# Patient Record
Sex: MALE | Race: WHITE | Employment: FULL TIME | URBAN - METROPOLITAN AREA
[De-identification: names, ages, dates, MRNs, and addresses within clinical notes are randomized per-mention and may not be internally consistent; named-entity substitution may affect disease eponyms.]

---

## 2017-02-23 ENCOUNTER — OFFICE VISIT (OUTPATIENT)
Dept: INTERNAL MEDICINE | Facility: CLINIC | Age: 57
End: 2017-02-23
Payer: COMMERCIAL

## 2017-02-23 VITALS
WEIGHT: 188 LBS | TEMPERATURE: 97.8 F | HEIGHT: 74 IN | OXYGEN SATURATION: 97 % | SYSTOLIC BLOOD PRESSURE: 110 MMHG | HEART RATE: 54 BPM | DIASTOLIC BLOOD PRESSURE: 66 MMHG | BODY MASS INDEX: 24.13 KG/M2

## 2017-02-23 DIAGNOSIS — Z12.5 SCREENING FOR PROSTATE CANCER: ICD-10-CM

## 2017-02-23 DIAGNOSIS — N40.1 BENIGN NON-NODULAR PROSTATIC HYPERPLASIA WITH LOWER URINARY TRACT SYMPTOMS: ICD-10-CM

## 2017-02-23 DIAGNOSIS — Z00.01 ENCOUNTER FOR ROUTINE ADULT MEDICAL EXAM WITH ABNORMAL FINDINGS: Primary | ICD-10-CM

## 2017-02-23 DIAGNOSIS — R61 NIGHT SWEATS: ICD-10-CM

## 2017-02-23 LAB
ALBUMIN SERPL-MCNC: 4 G/DL (ref 3.4–5)
ALP SERPL-CCNC: 56 U/L (ref 40–150)
ALT SERPL W P-5'-P-CCNC: 23 U/L (ref 0–70)
ANION GAP SERPL CALCULATED.3IONS-SCNC: 4 MMOL/L (ref 3–14)
AST SERPL W P-5'-P-CCNC: 20 U/L (ref 0–45)
BILIRUB SERPL-MCNC: 0.6 MG/DL (ref 0.2–1.3)
BUN SERPL-MCNC: 17 MG/DL (ref 7–30)
CALCIUM SERPL-MCNC: 9.1 MG/DL (ref 8.5–10.1)
CHLORIDE SERPL-SCNC: 108 MMOL/L (ref 94–109)
CO2 SERPL-SCNC: 30 MMOL/L (ref 20–32)
CREAT SERPL-MCNC: 1.13 MG/DL (ref 0.66–1.25)
ERYTHROCYTE [DISTWIDTH] IN BLOOD BY AUTOMATED COUNT: 12.9 % (ref 10–15)
GFR SERPL CREATININE-BSD FRML MDRD: 67 ML/MIN/1.7M2
GLUCOSE SERPL-MCNC: 89 MG/DL (ref 70–99)
HCT VFR BLD AUTO: 43.1 % (ref 40–53)
HGB BLD-MCNC: 14.4 G/DL (ref 13.3–17.7)
LDH SERPL L TO P-CCNC: 150 U/L (ref 85–227)
MCH RBC QN AUTO: 30.6 PG (ref 26.5–33)
MCHC RBC AUTO-ENTMCNC: 33.4 G/DL (ref 31.5–36.5)
MCV RBC AUTO: 92 FL (ref 78–100)
PLATELET # BLD AUTO: 166 10E9/L (ref 150–450)
POTASSIUM SERPL-SCNC: 4.3 MMOL/L (ref 3.4–5.3)
PROT SERPL-MCNC: 7.2 G/DL (ref 6.8–8.8)
PSA SERPL-ACNC: 2.66 UG/L (ref 0–4)
RBC # BLD AUTO: 4.7 10E12/L (ref 4.4–5.9)
SODIUM SERPL-SCNC: 142 MMOL/L (ref 133–144)
WBC # BLD AUTO: 5.2 10E9/L (ref 4–11)

## 2017-02-23 PROCEDURE — 84270 ASSAY OF SEX HORMONE GLOBUL: CPT | Performed by: INTERNAL MEDICINE

## 2017-02-23 PROCEDURE — 84403 ASSAY OF TOTAL TESTOSTERONE: CPT | Performed by: INTERNAL MEDICINE

## 2017-02-23 PROCEDURE — 99396 PREV VISIT EST AGE 40-64: CPT | Performed by: INTERNAL MEDICINE

## 2017-02-23 PROCEDURE — 85027 COMPLETE CBC AUTOMATED: CPT | Performed by: INTERNAL MEDICINE

## 2017-02-23 PROCEDURE — 83615 LACTATE (LD) (LDH) ENZYME: CPT | Performed by: INTERNAL MEDICINE

## 2017-02-23 PROCEDURE — G0103 PSA SCREENING: HCPCS | Performed by: INTERNAL MEDICINE

## 2017-02-23 PROCEDURE — 80053 COMPREHEN METABOLIC PANEL: CPT | Performed by: INTERNAL MEDICINE

## 2017-02-23 PROCEDURE — 36415 COLL VENOUS BLD VENIPUNCTURE: CPT | Performed by: INTERNAL MEDICINE

## 2017-02-23 RX ORDER — TAMSULOSIN HYDROCHLORIDE 0.4 MG/1
0.4 CAPSULE ORAL DAILY
Qty: 90 CAPSULE | Refills: 3 | Status: SHIPPED | OUTPATIENT
Start: 2017-02-23 | End: 2023-11-22

## 2017-02-23 NOTE — PROGRESS NOTES
SUBJECTIVE:     CC: Vaughn Henderson is an 57 year old male who presents for preventative health visit.     Healthy Habits:    Do you get at least three servings of calcium containing foods daily (dairy, green leafy vegetables, etc.)? no, taking calcium and/or vitamin D supplement: no    Amount of exercise or daily activities, outside of work: 3 day(s) per week    Problems taking medications regularly No    Medication side effects: No    Have you had an eye exam in the past two years? yes    Do you see a dentist twice per year? yearly    Do you have sleep apnea, excessive snoring or daytime drowsiness?no    No identified additional risks  The 10-year ASCVD risk score (Vondacarlito HUANG Jr., et al., 2013) is: 4.1%    Values used to calculate the score:      Age: 57 years      Sex: Male      Is Non- : No      Diabetic: No      Tobacco smoker: No      Systolic Blood Pressure: 110 mmHg      Is Prescribed Antihypertensives: No      HDL Cholesterol: 44 mg/dL      Total Cholesterol: 145 mg/dL          Today's PHQ-2 Score:   PHQ-2 ( 1999 Pfizer) 2/23/2017 5/3/2016   Q1: Little interest or pleasure in doing things 0 0   Q2: Feeling down, depressed or hopeless 0 0   PHQ-2 Score 0 0       Abuse: Current or Past(Physical, Sexual or Emotional)- No  Do you feel safe in your environment - Yes    Social History   Substance Use Topics     Smoking status: Never Smoker     Smokeless tobacco: Never Used     Alcohol use Yes      Comment: socially     The patient does not drink >3 drinks per day nor >7 drinks per week.    Last PSA:   PSA   Date Value Ref Range Status   09/10/2012 1.19 0 - 4 ug/L Final       Recent Labs   Lab Test  12/14/15   0742  12/12/14   1025  09/12/13   0737   CHOL  145  155  154   HDL  44  46  37*   LDL  88  99  100   TRIG  64  52  79   CHOLHDLRATIO   --   3.4  4.1   NHDL  101   --    --        Reviewed orders with patient. Reviewed health maintenance and updated orders accordingly - Yes    All  "Histories reviewed and updated in Epic.      ROS:  C: NEGATIVE for fever, chills. Weight down 5 pounds. Night sweats 3x/week. Shirts gets drenched  I: NEGATIVE for worrisome rashes, moles or lesions  E: NEGATIVE for vision changes or irritation  ENT: NEGATIVE for ear, mouth and throat problems  R: NEGATIVE for significant cough or SOB  CV: NEGATIVE for chest pain, palpitations or peripheral edema  GI: NEGATIVE for nausea, abdominal pain, heartburn, or change in bowel habits   male: negative for dysuria, hematuria,   erectile dysfunction, urethral discharge. Slight slowing of stream.   M: NEGATIVE for significant arthralgias or myalgia. Mild soreness left mid arch of foot at end of the day. Running 9 miles/week  N: NEGATIVE for weakness, dizziness or paresthesias  P: NEGATIVE for changes in mood or affect    Problem list, Medication list, Allergies, and Medical/Social/Surgical histories reviewed in [facility] and updated as appropriate.  OBJECTIVE:     /66  Pulse 54  Temp 97.8  F (36.6  C) (Oral)  Ht 6' 2\" (1.88 m)  Wt 188 lb (85.3 kg)  SpO2 97%  BMI 24.14 kg/m2  EXAM:  General appearance - healthy, alert, no distress  Skin - No rashes or lesions.  Head - normocephalic, atraumatic  Eyes - LILLY, EOMI, fundi exam with nondilated pupils negative.  Ears - External ears normal. Canals clear. TM's normal.  Nose/Sinuses - Nares normal. Septum midline. Mucosa normal. No drainage or sinus tenderness.  Oropharynx - No erythema, no adenopathy, no exudates.  Neck - Supple without adenopathy or thyromegaly. No bruits.  Lungs - Clear to auscultation without wheezes/rhonchi.  Heart - Regular rate and rhythm without murmurs, clicks, or gallops.  Nodes - No supraclavicular, axillary, or inguinal adenopathy palpable.  Abdomen - Abdomen soft, non-tender. BS normal. No masses or hepatosplenomegaly palpable. No bruits.  Extremities -No cyanosis, clubbing or edema.    Musculoskeletal - Spine ROM normal. Muscular strength intact.  NO " "current foot tenderness to palpation. No heel tenderness  Peripheral pulses - radial=4/4, femoral=4/4, posterior tibial=4/4, dorsalis pedis=4/4,  Neuro - Gait normal. Reflexes normal and symmetric. Sensation grossly WNL.  Genital - Normal-appearing male external genitalia. No scrotal masses or inguinal hernia palpable.   Rectal - Guaic negative stool. Normal tone. Prostate 2 plus in size to palpation. No rectal masses or prostate nodularity palpable      ASSESSMENT/PLAN:     1. Encounter for routine adult medical exam with abnormal findings   HCM UTD- Testosterone Free and Total  - CBC with platelets  - Lactate Dehydrogenase  - Comprehensive metabolic panel    2. Night sweats  Exam benign. No  Snoring/apnea seen by wife. Labs as below  - Testosterone Free and Total  - CBC with platelets  - Lactate Dehydrogenase  - Comprehensive metabolic panel    3. Screening for prostate cancer   PSA ordered for intermittent monitoring after discussion with pt and pt preference. Not doing annual PSA per USPTF recs  - Prostate spec antigen screen    4. Benign non-nodular prostatic hyperplasia with lower urinary tract symptoms  Continue current meds. Declines need for addition treatment. Discussed option Finasteride, TURP, etc  - tamsulosin (FLOMAX) 0.4 MG capsule; Take 1 capsule (0.4 mg) by mouth daily  Dispense: 90 capsule; Refill: 3    COUNSELING:  Reviewed preventive health counseling, as reflected in patient instructions   Good arch support for shoes       reports that he has never smoked. He has never used smokeless tobacco.    Estimated body mass index is 24.14 kg/(m^2) as calculated from the following:    Height as of this encounter: 6' 2\" (1.88 m).    Weight as of this encounter: 188 lb (85.3 kg).       Counseling Resources:  ATP IV Guidelines  Pooled Cohorts Equation Calculator  FRAX Risk Assessment  ICSI Preventive Guidelines  Dietary Guidelines for Americans, 2010  USDA's MyPlate  ASA Prophylaxis  Lung CA " Screening    Zhao Mitchell MD  [facility]

## 2017-02-23 NOTE — MR AVS SNAPSHOT
After Visit Summary   2/23/2017    Vaughn Henderson    MRN: [id number]           Patient Information     Date Of Birth          1960        Visit Information        Provider Department      2/23/2017 7:00 AM Zhao Mitchell MD [facility]        Today's Diagnoses     Encounter for routine adult medical exam with abnormal findings    -  1    Night sweats        Screening for prostate cancer        Benign non-nodular prostatic hyperplasia with lower urinary tract symptoms          Care Instructions      Preventive Health Recommendations  Male Ages 50 - 64    Yearly exam:             See your health care provider every year in order to  o   Review health changes.   o   Discuss preventive care.    o   Review your medicines if your doctor has prescribed any.     Have a cholesterol test every 5 years, or more frequently if you are at risk for high cholesterol/heart disease.     Have a diabetes test (fasting glucose) every three years. If you are at risk for diabetes, you should have this test more often.     Have a colonoscopy at age 50, or have a yearly FIT test (stool test). These exams will check for colon cancer.      Talk with your health care provider about whether or not a prostate cancer screening test (PSA) is right for you.    You should be tested each year for STDs (sexually transmitted diseases), if you re at risk.     Shots: Get a flu shot each year. Get a tetanus shot every 10 years.     Nutrition:    Eat at least 5 servings of fruits and vegetables daily.     Eat whole-grain bread, whole-wheat pasta and brown rice instead of white grains and rice.     Talk to your provider about Calcium and Vitamin D.     Lifestyle    Exercise for at least 150 minutes a week (30 minutes a day, 5 days a week). This will help you control your weight and prevent disease.     Limit alcohol to one drink per day.     No smoking.     Wear sunscreen to prevent skin cancer.     See your dentist  "every six months for an exam and cleaning.     See your eye doctor every 1 to 2 years.          Follow-ups after your visit        Who to contact     If you have questions or need follow up information about today's clinic visit or your schedule please contact [facility] directly at [phone].  Normal or non-critical lab and imaging results will be communicated to you by MyChart, letter or phone within 4 business days after the clinic has received the results. If you do not hear from us within 7 days, please contact the clinic through [employer] or phone. If you have a critical or abnormal lab result, we will notify you by phone as soon as possible.  Submit refill requests through [employer] or call your pharmacy and they will forward the refill request to us. Please allow 3 business days for your refill to be completed.          Additional Information About Your Visit        MyChart Information     [employer] gives you secure access to your electronic health record. If you see a primary care provider, you can also send messages to your care team and make appointments. If you have questions, please call your primary care clinic.  If you do not have a primary care provider, please call [phone] and they will assist you.        Care EveryWhere ID     This is your Care EveryWhere ID. This could be used by other organizations to access your [facility] medical records  [id number]        Your Vitals Were     Pulse Temperature Height Pulse Oximetry BMI (Body Mass Index)       54 97.8  F (36.6  C) (Oral) 6' 2\" (1.88 m) 97% 24.14 kg/m2        Blood Pressure from Last 3 Encounters:   02/23/17 110/66   05/03/16 102/66   12/14/15 104/66    Weight from Last 3 Encounters:   02/23/17 188 lb (85.3 kg)   05/03/16 194 lb (88 kg)   12/14/15 187 lb (84.8 kg)              We Performed the Following     CBC with platelets     Comprehensive metabolic panel     Lactate Dehydrogenase     Prostate spec antigen screen  "    Testosterone Free and Total          Where to get your medicines      These medications were sent to [id number] - [place], [place] - [address] AT [facility] [address]  [address], [place] [address]     Phone:  [phone]     tamsulosin 0.4 MG capsule          Primary Care Provider Office Phone # Fax #    Zhao Mitchell [phone]       [facility] [address] [facility] [address]        Thank you!     Thank you for choosing [facility]  for your care. Our goal is always to provide you with excellent care. Hearing back from our patients is one way we can continue to improve our services. Please take a few minutes to complete the written survey that you may receive in the mail after your visit with us. Thank you!             Your Updated Medication List - Protect others around you: Learn how to safely use, store and throw away your medicines at www.disposemymeds.org.          This list is accurate as of: 2/23/17  7:43 AM.  Always use your most recent med list.                   Brand Name Dispense Instructions for use    tamsulosin 0.4 MG capsule    FLOMAX    90 capsule    Take 1 capsule (0.4 mg) by mouth daily

## 2017-02-23 NOTE — NURSING NOTE
"Chief Complaint   Patient presents with     Physical     fasting       Initial /66  Pulse 54  Temp 97.8  F (36.6  C) (Oral)  Ht 6' 2\" (1.88 m)  Wt 188 lb (85.3 kg)  SpO2 97%  BMI 24.14 kg/m2 Estimated body mass index is 24.14 kg/(m^2) as calculated from the following:    Height as of this encounter: 6' 2\" (1.88 m).    Weight as of this encounter: 188 lb (85.3 kg).  Medication Reconciliation: complete    "

## 2017-02-24 ENCOUNTER — MYC MEDICAL ADVICE (OUTPATIENT)
Dept: INTERNAL MEDICINE | Facility: CLINIC | Age: 57
End: 2017-02-24

## 2017-02-24 LAB
SHBG SERPL-SCNC: 45 NMOL/L (ref 11–80)
TESTOST FREE SERPL-MCNC: 8.58 NG/DL (ref 4.7–24.4)
TESTOST SERPL-MCNC: 499 NG/DL (ref 240–950)

## 2018-01-15 ENCOUNTER — OFFICE VISIT (OUTPATIENT)
Dept: INTERNAL MEDICINE | Facility: CLINIC | Age: 58
End: 2018-01-15
Payer: COMMERCIAL

## 2018-01-15 ENCOUNTER — RADIANT APPOINTMENT (OUTPATIENT)
Dept: GENERAL RADIOLOGY | Facility: CLINIC | Age: 58
End: 2018-01-15
Attending: INTERNAL MEDICINE
Payer: COMMERCIAL

## 2018-01-15 DIAGNOSIS — M25.561 CHRONIC PAIN OF RIGHT KNEE: ICD-10-CM

## 2018-01-15 DIAGNOSIS — G89.29 CHRONIC PAIN OF RIGHT KNEE: ICD-10-CM

## 2018-01-15 DIAGNOSIS — M25.561 CHRONIC PAIN OF RIGHT KNEE: Primary | ICD-10-CM

## 2018-01-15 DIAGNOSIS — M19.90 ARTHRITIS: ICD-10-CM

## 2018-01-15 DIAGNOSIS — G89.29 CHRONIC PAIN OF RIGHT KNEE: Primary | ICD-10-CM

## 2018-01-15 LAB
CRP SERPL-MCNC: <2.9 MG/L (ref 0–8)
URATE SERPL-MCNC: 6.6 MG/DL (ref 3.5–7.2)

## 2018-01-15 PROCEDURE — 84550 ASSAY OF BLOOD/URIC ACID: CPT | Performed by: INTERNAL MEDICINE

## 2018-01-15 PROCEDURE — 73560 X-RAY EXAM OF KNEE 1 OR 2: CPT | Mod: RT

## 2018-01-15 PROCEDURE — 86140 C-REACTIVE PROTEIN: CPT | Performed by: INTERNAL MEDICINE

## 2018-01-15 PROCEDURE — 36415 COLL VENOUS BLD VENIPUNCTURE: CPT | Performed by: INTERNAL MEDICINE

## 2018-01-15 PROCEDURE — 86431 RHEUMATOID FACTOR QUANT: CPT | Performed by: INTERNAL MEDICINE

## 2018-01-15 PROCEDURE — 99213 OFFICE O/P EST LOW 20 MIN: CPT | Performed by: INTERNAL MEDICINE

## 2018-01-15 NOTE — MR AVS SNAPSHOT
After Visit Summary   1/15/2018    Vaughn Henderson    MRN: [id number]           Patient Information     Date Of Birth          1960        Visit Information        Provider Department      1/15/2018 2:00 PM Zhao Mitchell MD [facility]        Today's Diagnoses     Chronic pain of right knee    -  1    Arthritis           Follow-ups after your visit        Additional Services     [facility] PT, HAND, AND CHIROPRACTIC REFERRAL       **This order will print in the [facility] Scheduling Office**    Physical Therapy, Hand Therapy and Chiropractic Care are available through:    *[facility]  *[facility]  *[facility]    Call one number to schedule at any of the above locations: [phone].    Your provider has referred you to: Physical Therapy at [facility] or [facility]    Indication/Reason for Referral: right medial knee pain  Onset of Illness: 2  months  Therapy Orders: Evaluate and Treat  Special Programs:  Running Injury  Special Request: None    Babar Eckert      Additional Comments for the Therapist or Chiropractor: normal Xray right knee    Please be aware that coverage of these services is subject to the terms and limitations of your health insurance plan.  Call member services at your health plan with any benefit or coverage questions.      Please bring the following to your appointment:    *Your personal calendar for scheduling future appointments  *Comfortable clothing                  Who to contact     If you have questions or need follow up information about today's clinic visit or your schedule please contact [facility] directly at [phone].  Normal or non-critical lab and imaging results will be communicated to you by MyChart, letter or phone within 4 business days after the clinic has received the results. If you do not hear from us within 7 days, please contact the clinic through MyChart or phone. If  "you have a critical or abnormal lab result, we will notify you by phone as soon as possible.  Submit refill requests through [employer] or call your pharmacy and they will forward the refill request to us. Please allow 3 business days for your refill to be completed.          Additional Information About Your Visit        [employer] Information     [employer] gives you secure access to your electronic health record. If you see a primary care provider, you can also send messages to your care team and make appointments. If you have questions, please call your primary care clinic.  If you do not have a primary care provider, please call [phone] and they will assist you.        Care EveryWhere ID     This is your Care EveryWhere ID. This could be used by other organizations to access your [facility] medical records  [id number]        Your Vitals Were     Pulse Height Pulse Oximetry BMI (Body Mass Index)          57 6' 2\" (1.88 m) 98% 24.82 kg/m2         Blood Pressure from Last 3 Encounters:   01/15/18 112/60   02/23/17 110/66   05/03/16 102/66    Weight from Last 3 Encounters:   01/15/18 193 lb 4.8 oz (87.7 kg)   02/23/17 188 lb (85.3 kg)   05/03/16 194 lb (88 kg)              We Performed the Following     CRP inflammation     [facility] PT, HAND, AND CHIROPRACTIC REFERRAL     Rheumatoid factor     Uric acid        Primary Care Provider Office Phone # Fax #    Zhao Mitchell [phone]       [address] [facility] [address]        Equal Access to Services     MAYANK ANGUIANO : Hadii aad ku hadasho Soomaali, waaxda luqadaha, qaybta kaalmada adeegyada, salima schaefer . So [facility] [phone].    ATENCIÓN: Si habla español, tiene a smith disposición servicios gratuitos de asistencia lingüística. Llame al [phone].    We comply with applicable federal civil rights laws and [place] laws. We do not discriminate on the basis of race, color, national origin, age, disability, sex, sexual " orientation, or gender identity.            Thank you!     Thank you for choosing [facility]  for your care. Our goal is always to provide you with excellent care. Hearing back from our patients is one way we can continue to improve our services. Please take a few minutes to complete the written survey that you may receive in the mail after your visit with us. Thank you!             Your Updated Medication List - Protect others around you: Learn how to safely use, store and throw away your medicines at www.disposemymeds.org.          This list is accurate as of: 1/15/18 11:59 PM.  Always use your most recent med list.                   Brand Name Dispense Instructions for use Diagnosis    tamsulosin 0.4 MG capsule    FLOMAX    90 capsule    Take 1 capsule (0.4 mg) by mouth daily    Benign non-nodular prostatic hyperplasia with lower urinary tract symptoms

## 2018-01-15 NOTE — PROGRESS NOTES
"  SUBJECTIVE:   Vaughn Henderson is a 57 year old male who presents to clinic today for the following health issues:  Left hand arthritis and Right knee pain    Pt's past medical history, family history, habits, medications and allergies were reviewed with the patient today.  See snap shot for  HCM status. Most recent lab results reviewed with pt. Problem list and histories reviewed & adjusted, as indicated.  Additional history as below:    Left hand stiffness in AM for 1-2 hrs and then resolves. No trauma. No  acute swelling or redness.  No specific pain.  Pain  right inner knee that stopping pt from running  > 1 mile. Sx for 2 mos. no falls or other trauma.  No acute knee swelling.  No issues with walking up or down stairs.  No pain with general walking activity.  Generally only occurs with running  Denies other acute complaints     Additional ROS:   Constitutional, HEENT, Cardiovascular, Pulmonary, GI and , Neuro, MSK and Psych review of systems/symptoms are otherwise negative or unchanged from previous, except as noted above.      OBJECTIVE:  /60  Pulse 57  Ht 6' 2\" (1.88 m)  Wt 193 lb 4.8 oz (87.7 kg)  SpO2 98%  BMI 24.82 kg/m2   Estimated body mass index is 24.82 kg/(m^2) as calculated from the following:    Height as of this encounter: 6' 2\" (1.88 m).    Weight as of this encounter: 193 lb 4.8 oz (87.7 kg).    Neck: no adenopathy. Thyroid normal to palpation. No bruits  Pulm: Lungs clear to auscultation   CV: Regular rates and rhythm  GI: Soft, nontender, Normal active bowel sounds, No hepatosplenomegaly or masses palpable  Ext: Peripheral pulses intact. No edema.  Neuro: Normal strength and tone, sensory exam grossly normal  MSK: DIP and PIP joints hands with .oa silvana no acutenges.  Synovial thickening, warmth, erythema.  Right knee tender to palpation along the  medial collateral ligament.  Ligament exam grossly intact to  Stress testing of the ligaments with drawer test/etc. No effusion.  No " tenderness along the knee joint line    Assessment/Plan: (See plan discussion below for further details)  1. Chronic pain of right knee  X-ray right knee normal.  Will refer to physical therapy runners program.  If not improving with this, patient will inform physician and will refer to orthopedics  - XR Knee Standing Right 2 Views; Future  - [facility] PT, HAND, AND CHIROPRACTIC REFERRAL    2. Arthritis  Probable osteoarthritis.  Labs to rule out other etiology. Tylenol prn  - CRP inflammation  - Uric acid  - Rheumatoid factor         Zhao Mitchell MD  Internal Medicine Department  [facility]

## 2018-01-16 LAB — RHEUMATOID FACT SER NEPH-ACNC: <20 IU/ML (ref 0–20)

## 2018-01-17 VITALS
HEART RATE: 57 BPM | DIASTOLIC BLOOD PRESSURE: 60 MMHG | BODY MASS INDEX: 24.81 KG/M2 | OXYGEN SATURATION: 98 % | WEIGHT: 193.3 LBS | SYSTOLIC BLOOD PRESSURE: 112 MMHG | HEIGHT: 74 IN

## 2018-06-14 ENCOUNTER — OFFICE VISIT (OUTPATIENT)
Dept: INTERNAL MEDICINE | Facility: CLINIC | Age: 58
End: 2018-06-14
Payer: COMMERCIAL

## 2018-06-14 VITALS
SYSTOLIC BLOOD PRESSURE: 116 MMHG | TEMPERATURE: 98.2 F | RESPIRATION RATE: 16 BRPM | WEIGHT: 189 LBS | BODY MASS INDEX: 24.27 KG/M2 | DIASTOLIC BLOOD PRESSURE: 64 MMHG | HEART RATE: 72 BPM

## 2018-06-14 DIAGNOSIS — N40.1 BENIGN NON-NODULAR PROSTATIC HYPERPLASIA WITH LOWER URINARY TRACT SYMPTOMS: ICD-10-CM

## 2018-06-14 DIAGNOSIS — M20.41 HAMMER TOE OF RIGHT FOOT: ICD-10-CM

## 2018-06-14 DIAGNOSIS — Z13.6 CARDIOVASCULAR SCREENING; LDL GOAL LESS THAN 130: ICD-10-CM

## 2018-06-14 DIAGNOSIS — Z00.01 ENCOUNTER FOR ROUTINE ADULT MEDICAL EXAM WITH ABNORMAL FINDINGS: Primary | ICD-10-CM

## 2018-06-14 DIAGNOSIS — Z12.5 SCREENING FOR PROSTATE CANCER: ICD-10-CM

## 2018-06-14 DIAGNOSIS — R05.9 COUGH: ICD-10-CM

## 2018-06-14 LAB
CHOLEST SERPL-MCNC: 157 MG/DL
HDLC SERPL-MCNC: 45 MG/DL
LDLC SERPL CALC-MCNC: 100 MG/DL
NONHDLC SERPL-MCNC: 112 MG/DL
PSA SERPL-ACNC: 4.16 UG/L (ref 0–4)
TRIGL SERPL-MCNC: 60 MG/DL

## 2018-06-14 PROCEDURE — G0103 PSA SCREENING: HCPCS | Performed by: INTERNAL MEDICINE

## 2018-06-14 PROCEDURE — 99396 PREV VISIT EST AGE 40-64: CPT | Performed by: INTERNAL MEDICINE

## 2018-06-14 PROCEDURE — 36415 COLL VENOUS BLD VENIPUNCTURE: CPT | Performed by: INTERNAL MEDICINE

## 2018-06-14 PROCEDURE — 80061 LIPID PANEL: CPT | Performed by: INTERNAL MEDICINE

## 2018-06-14 NOTE — MR AVS SNAPSHOT
After Visit Summary   6/14/2018    Vaughn Henderson    MRN: [id number]           Patient Information     Date Of Birth          1960        Visit Information        Provider Department      6/14/2018 7:00 AM Zhao Mitchell MD [facility]        Today's Diagnoses     Encounter for routine adult medical exam with abnormal findings    -  1    Benign non-nodular prostatic hyperplasia with lower urinary tract symptoms        CARDIOVASCULAR SCREENING; LDL GOAL LESS THAN 130        Screening for prostate cancer        Cough          Care Instructions    Zyrtec daily for 2 weeks to see if cough resolves. If not, then send me note and will use Flovent steroid inhaler for a few weeks  Prescriptions refilled.    Labs today as ordered   Check with insurance re: Shingrix vaccine for shingles prevention. Check if covered and if they prefer it to be given in clinic or pharmacy. Schedule nurse only appt if wish to receive in clinic. This is a 2 shot series done 2-6 months apart          Follow-ups after your visit        Who to contact     If you have questions or need follow up information about today's clinic visit or your schedule please contact [facility] directly at [phone].  Normal or non-critical lab and imaging results will be communicated to you by MyChart, letter or phone within 4 business days after the clinic has received the results. If you do not hear from us within 7 days, please contact the clinic through [employer] or phone. If you have a critical or abnormal lab result, we will notify you by phone as soon as possible.  Submit refill requests through [employer] or call your pharmacy and they will forward the refill request to us. Please allow 3 business days for your refill to be completed.          Additional Information About Your Visit        MyChart Information     [employer] gives you secure access to your electronic health record. If you see a  primary care provider, you can also send messages to your care team and make appointments. If you have questions, please call your primary care clinic.  If you do not have a primary care provider, please call [phone] and they will assist you.        Care EveryWhere ID     This is your Care EveryWhere ID. This could be used by other organizations to access your [place] medical records  [id number]        Your Vitals Were     Pulse Temperature Respirations BMI (Body Mass Index)          72 98.2  F (36.8  C) (Oral) 16 24.27 kg/m2         Blood Pressure from Last 3 Encounters:   06/14/18 116/64   01/15/18 112/60   02/23/17 110/66    Weight from Last 3 Encounters:   06/14/18 189 lb (85.7 kg)   01/15/18 193 lb 4.8 oz (87.7 kg)   02/23/17 188 lb (85.3 kg)              We Performed the Following     Lipid panel reflex to direct LDL Fasting     Prostate spec antigen screen        Primary Care Provider Office Phone # Fax #    Zhao Mitchell [phone]       [address] [place] [address]        Equal Access to Services     [facility] : Hadii aad ku hadasho Soomaali, waaxda luqadaha, qaybta kaalmada adeegyada, salima rivera hayelmern ivette schaefer . So [facility] [phone].    ATENCIÓN: Si habla español, tiene a smith disposición servicios gratuitos de asistencia lingüística. Llame al [phone].    We comply with applicable federal civil rights laws and [place] laws. We do not discriminate on the basis of race, color, national origin, age, disability, sex, sexual orientation, or gender identity.            Thank you!     Thank you for choosing [facility]  for your care. Our goal is always to provide you with excellent care. Hearing back from our patients is one way we can continue to improve our services. Please take a few minutes to complete the written survey that you may receive in the mail after your visit with us. Thank you!             Your Updated Medication List  - Protect others around you: Learn how to safely use, store and throw away your medicines at www.disposemymeds.org.          This list is accurate as of 6/14/18  7:41 AM.  Always use your most recent med list.                   Brand Name Dispense Instructions for use Diagnosis    tamsulosin 0.4 MG capsule    FLOMAX    90 capsule    Take 1 capsule (0.4 mg) by mouth daily    Benign non-nodular prostatic hyperplasia with lower urinary tract symptoms

## 2018-06-14 NOTE — PATIENT INSTRUCTIONS
Zyrtec daily for 2 weeks to see if cough resolves. If not, then send me note and will use Flovent steroid inhaler for a few weeks  Prescriptions refilled.    Labs today as ordered   Check with insurance re: Shingrix vaccine for shingles prevention. Check if covered and if they prefer it to be given in clinic or pharmacy. Schedule nurse only appt if wish to receive in clinic. This is a 2 shot series done 2-6 months apart  Podiatry appt if toe issues persist

## 2018-06-14 NOTE — PROGRESS NOTES
SUBJECTIVE:   CC: Vaughn Henderson is an 58 year old male who presents for preventative health visit.     Healthy Habits:  Answers for HPI/ROS submitted by the patient on 6/14/2018   Annual Exam:  Getting at least 3 servings of Calcium per day:: NO  Bi-annual eye exam:: Yes  Dental care twice a year:: Yes  Sleep apnea or symptoms of sleep apnea:: None  Diet:: Regular (no restrictions)  Taking medications regularly:: Yes  Medication side effects:: None  Additional concerns today:: YES  PHQ-2 Score: 0          Today's PHQ-2 Score:   PHQ-2 ( 1999 Pfizer) 6/14/2018 2/23/2017   Q1: Little interest or pleasure in doing things 0 0   Q2: Feeling down, depressed or hopeless 0 0   PHQ-2 Score 0 0   Q1: Little interest or pleasure in doing things Not at all -   Q2: Feeling down, depressed or hopeless Not at all -   PHQ-2 Score 0 -       Abuse: Current or Past(Physical, Sexual or Emotional)- No  Do you feel safe in your environment - Yes    Social History   Substance Use Topics     Smoking status: Never Smoker     Smokeless tobacco: Never Used     Alcohol use Yes      Comment: socially      If you drink alcohol do you typically have >3 drinks per day or >7 drinks per week? No                      Last PSA:   PSA   Date Value Ref Range Status   02/23/2017 2.66 0 - 4 ug/L Final     Comment:     Assay Method:  Chemiluminescence using Siemens Vista analyzer       Reviewed orders with patient. Reviewed health maintenance and updated orders accordingly - Yes  Labs reviewed in EPIC    Reviewed and updated as needed this visit by clinical staff         Reviewed and updated as needed this visit by Provider            ROS:  CONSTITUTIONAL: NEGATIVE for fever, chills. Weight  Down 4 pounds  INTEGUMENTARY/SKIN: NEGATIVE for worrisome rashes, moles or lesions  EYES: NEGATIVE for vision changes or irritation  ENT: NEGATIVE for ear, mouth and throat problems. No sore throat   RESP: NEGATIVE for  SOB. Dry cough since April after a cold  CV:  NEGATIVE for chest pain, palpitations or peripheral edema  GI: NEGATIVE for nausea, abdominal pain, heartburn, or change in bowel habits   male: negative for dysuria, hematuria  erectile dysfunction, urethral discharge. Flow OK with Flomax  MUSCULOSKELETAL: NEGATIVE for significant arthralgias or myalgia except for  4th toe soreness. Sx when press on it. Run 4 times a week. Some mild chronic right ankle soreness. Walks when golfing  NEURO: NEGATIVE for weakness, dizziness or paresthesias  PSYCHIATRIC: NEGATIVE for changes in mood or affect    OBJECTIVE:   /64  Pulse 72  Temp 98.2  F (36.8  C) (Oral)  Resp 16  Wt 189 lb (85.7 kg)  BMI 24.27 kg/m2  EXAM:  General appearance - healthy, alert, no distress  Skin - No rashes or lesions.  Head - normocephalic, atraumatic  Eyes - LILLY, EOMI, fundi exam with nondilated pupils negative.  Ears - External ears normal. Canals clear. TM's normal.  Nose/Sinuses - Nares normal. Septum midline. Mucosa minimally edematous.  No drainage or sinus tenderness.  Oropharynx - No erythema, no adenopathy, no exudates.  Neck - Supple without adenopathy or thyromegaly. No bruits.  Lungs - Clear to auscultation without wheezes/rhonchi.  Heart - Regular rate and rhythm without murmurs, clicks, or gallops.  Nodes - No supraclavicular, axillary, or inguinal adenopathy palpable.  Abdomen - Abdomen soft, non-tender. BS normal. No masses or hepatosplenomegaly palpable. No bruits.  Extremities -No cyanosis, clubbing or edema.    Musculoskeletal - Spine ROM normal. Muscular strength intact.  Minimal hammertoe formation right fourth toe  Peripheral pulses - radial=4/4, femoral=4/4, posterior tibial=4/4, dorsalis pedis=4/4,  Neuro - Gait normal. Reflexes normal and symmetric. Sensation grossly WNL.  Genital - Normal-appearing male external genitalia. No scrotal masses or inguinal hernia palpable.   Rectal - Guaic negative stool. Normal tone. Prostate 2 plus in size. No distinct nodule  "palpable but right side slightly more prominent.  No rectal masses  palpable      ASSESSMENT/PLAN:   1. Encounter for routine adult medical exam with abnormal findings  See plan discussion below for HCM discussion. Otherwise UTD    2. Benign non-nodular prostatic hyperplasia with lower urinary tract symptoms  Symptoms controlled with Flomax.  Being prescribed by urology    3. CARDIOVASCULAR SCREENING; LDL GOAL LESS THAN 130  - Lipid panel reflex to direct LDL Fasting    4. Screening for prostate cancer   PSA ordered for intermittent monitoring after discussion with pt and pt preference.  If PSA abnormal, will discuss MRI prostate with pt's urologist Dr Oviedo.  - Prostate spec antigen screen    5. Cough  Postnasal drainage versus lingering postinflammatory issues from previous viral URI.  Lung exam normal. See plan discussion below.     6. Hammer toe of right foot  Minimal. Higher arch of foot. Pt will use good arch supports with runing shoes. See podiatry if sx bothersome still      COUNSELING:  Reviewed preventive health counseling, as reflected in patient instructions       reports that he has never smoked. He has never used smokeless tobacco.    Estimated body mass index is 24.82 kg/(m^2) as calculated from the following:    Height as of 1/15/18: 6' 2\" (1.88 m).    Weight as of 1/15/18: 193 lb 4.8 oz (87.7 kg).       Counseling Resources:  ATP IV Guidelines  Pooled Cohorts Equation Calculator  FRAX Risk Assessment  ICSI Preventive Guidelines  Dietary Guidelines for Americans, 2010  USDA's MyPlate  ASA Prophylaxis  Lung CA Screening      PLAN:  Zyrtec daily for 2 weeks to see if cough resolves. If not, then send me note and will use Flovent steroid inhaler for a few weeks  Prescriptions refilled.    Labs today as ordered   Check with insurance re: Shingrix vaccine for shingles prevention. Check if covered and if they prefer it to be given in clinic or pharmacy. Schedule nurse only appt if wish to receive in " clinic. This is a 2 shot series done 2-6 months apart  Podiatry appt if toe issues persist         Zhao Mitchell MD  [facility]

## 2018-06-22 ENCOUNTER — MYC MEDICAL ADVICE (OUTPATIENT)
Dept: INTERNAL MEDICINE | Facility: CLINIC | Age: 58
End: 2018-06-22

## 2018-06-25 NOTE — TELEPHONE ENCOUNTER
" Last clinic note and PSA's from past 7 years on my nurse's desk. Please fax to [facility]  \"attention Dr Oviedo  - labs and clinic note re: elevated PSA\"  "

## 2018-07-11 ENCOUNTER — TRANSFERRED RECORDS (OUTPATIENT)
Dept: HEALTH INFORMATION MANAGEMENT | Facility: CLINIC | Age: 58
End: 2018-07-11

## 2019-10-03 ENCOUNTER — HEALTH MAINTENANCE LETTER (OUTPATIENT)
Age: 59
End: 2019-10-03

## 2019-10-31 ENCOUNTER — OFFICE VISIT (OUTPATIENT)
Dept: INTERNAL MEDICINE | Facility: CLINIC | Age: 59
End: 2019-10-31
Payer: COMMERCIAL

## 2019-10-31 VITALS
BODY MASS INDEX: 24.65 KG/M2 | HEIGHT: 73 IN | WEIGHT: 186 LBS | RESPIRATION RATE: 16 BRPM | HEART RATE: 60 BPM | SYSTOLIC BLOOD PRESSURE: 116 MMHG | TEMPERATURE: 98.7 F | OXYGEN SATURATION: 96 % | DIASTOLIC BLOOD PRESSURE: 78 MMHG

## 2019-10-31 DIAGNOSIS — Z00.01 ENCOUNTER FOR ROUTINE ADULT MEDICAL EXAM WITH ABNORMAL FINDINGS: Primary | ICD-10-CM

## 2019-10-31 DIAGNOSIS — N40.1 BENIGN NON-NODULAR PROSTATIC HYPERPLASIA WITH LOWER URINARY TRACT SYMPTOMS: ICD-10-CM

## 2019-10-31 DIAGNOSIS — R06.83 SNORING: ICD-10-CM

## 2019-10-31 DIAGNOSIS — Z12.5 SCREENING FOR PROSTATE CANCER: ICD-10-CM

## 2019-10-31 LAB
ALBUMIN SERPL-MCNC: 3.7 G/DL (ref 3.4–5)
ALP SERPL-CCNC: 56 U/L (ref 40–150)
ALT SERPL W P-5'-P-CCNC: 23 U/L (ref 0–70)
ANION GAP SERPL CALCULATED.3IONS-SCNC: 6 MMOL/L (ref 3–14)
AST SERPL W P-5'-P-CCNC: 21 U/L (ref 0–45)
BILIRUB SERPL-MCNC: 0.6 MG/DL (ref 0.2–1.3)
BUN SERPL-MCNC: 19 MG/DL (ref 7–30)
CALCIUM SERPL-MCNC: 8.8 MG/DL (ref 8.5–10.1)
CHLORIDE SERPL-SCNC: 108 MMOL/L (ref 94–109)
CHOLEST SERPL-MCNC: 161 MG/DL
CO2 SERPL-SCNC: 27 MMOL/L (ref 20–32)
CREAT SERPL-MCNC: 1.15 MG/DL (ref 0.66–1.25)
GFR SERPL CREATININE-BSD FRML MDRD: 69 ML/MIN/{1.73_M2}
GLUCOSE SERPL-MCNC: 86 MG/DL (ref 70–99)
HDLC SERPL-MCNC: 38 MG/DL
LDLC SERPL CALC-MCNC: 109 MG/DL
NONHDLC SERPL-MCNC: 123 MG/DL
POTASSIUM SERPL-SCNC: 4.4 MMOL/L (ref 3.4–5.3)
PROT SERPL-MCNC: 7 G/DL (ref 6.8–8.8)
PSA SERPL-ACNC: 7.55 UG/L (ref 0–4)
SODIUM SERPL-SCNC: 141 MMOL/L (ref 133–144)
T4 FREE SERPL-MCNC: 0.79 NG/DL (ref 0.76–1.46)
TRIGL SERPL-MCNC: 71 MG/DL
TSH SERPL DL<=0.005 MIU/L-ACNC: 8 MU/L (ref 0.4–4)

## 2019-10-31 PROCEDURE — 84403 ASSAY OF TOTAL TESTOSTERONE: CPT | Performed by: INTERNAL MEDICINE

## 2019-10-31 PROCEDURE — 84439 ASSAY OF FREE THYROXINE: CPT | Performed by: INTERNAL MEDICINE

## 2019-10-31 PROCEDURE — G0103 PSA SCREENING: HCPCS | Performed by: INTERNAL MEDICINE

## 2019-10-31 PROCEDURE — 84443 ASSAY THYROID STIM HORMONE: CPT | Performed by: INTERNAL MEDICINE

## 2019-10-31 PROCEDURE — 99396 PREV VISIT EST AGE 40-64: CPT | Performed by: INTERNAL MEDICINE

## 2019-10-31 PROCEDURE — 80061 LIPID PANEL: CPT | Performed by: INTERNAL MEDICINE

## 2019-10-31 PROCEDURE — 36415 COLL VENOUS BLD VENIPUNCTURE: CPT | Performed by: INTERNAL MEDICINE

## 2019-10-31 PROCEDURE — 80053 COMPREHEN METABOLIC PANEL: CPT | Performed by: INTERNAL MEDICINE

## 2019-10-31 PROCEDURE — 84270 ASSAY OF SEX HORMONE GLOBUL: CPT | Performed by: INTERNAL MEDICINE

## 2019-10-31 ASSESSMENT — MIFFLIN-ST. JEOR: SCORE: 1712.57

## 2019-10-31 NOTE — PROGRESS NOTES
SUBJECTIVE:   CC: Vaughn Henderson is an 59 year old male who presents for preventative health visit.     Healthy Habits:     Getting at least 3 servings of Calcium per day:  Yes    Bi-annual eye exam:  Yes    Dental care twice a year:  NO    Sleep apnea or symptoms of sleep apnea:  None    Diet:  Regular (no restrictions)    Frequency of exercise:  1 day/week    Duration of exercise:  Less than 15 minutes    Taking medications regularly:  Yes    Medication side effects:  Not applicable    PHQ-2 Total Score: 0    Additional concerns today:  No        Today's PHQ-2 Score:   PHQ-2 ( 1999 Pfizer) 10/31/2019   Q1: Little interest or pleasure in doing things 0   Q2: Feeling down, depressed or hopeless 0   PHQ-2 Score 0   Q1: Little interest or pleasure in doing things Not at all   Q2: Feeling down, depressed or hopeless Not at all   PHQ-2 Score 0       Abuse: Current or Past(Physical, Sexual or Emotional)- No  Do you feel safe in your environment? Yes    Have you ever done Advance Care Planning? (For example, a Health Directive, POLST, or a discussion with a medical provider about your wishes): Yes, patient states has an Advance Care Planning document and will bring a copy to the clinic.    Social History     Tobacco Use     Smoking status: Never Smoker     Smokeless tobacco: Never Used   Substance Use Topics     Alcohol use: Yes     Comment: socially     If you drink alcohol do you typically have >3 drinks per day or >7 drinks per week? No    Alcohol Use 10/31/2019   Prescreen: >3 drinks/day or >7 drinks/week? No   Prescreen: >3 drinks/day or >7 drinks/week? -       Last PSA:   PSA   Date Value Ref Range Status   06/14/2018 4.16 (H) 0 - 4 ug/L Final     Comment:     Assay Method:  Chemiluminescence using Siemens Vista analyzer       Reviewed orders with patient. Reviewed health maintenance and updated orders accordingly - Yes  Labs reviewed in EPIC    Reviewed and updated as needed this visit by clinical staff  Allergies  " Meds         Reviewed and updated as needed this visit by Provider            Review of Systems  CONSTITUTIONAL: NEGATIVE for fever, chills. Weight down 2 pounds. Sweats one time/week at night. Pt snores at night to the point where his wife will have to either elbow him to turn over or she sometimes has to leave the bedroom to sleep elsewhere ETOH 2 nights/week and not corresponding to nights with sweats.  Never has sweats with exercise  INTEGUMENTARY/SKIN: NEGATIVE for worrisome rashes, moles or lesions  EYES: NEGATIVE for vision changes or irritation. Eye exam Oct 2018  ENT: NEGATIVE for ear, mouth and throat problems  RESP: NEGATIVE for significant cough or SOB  CV: NEGATIVE for chest pain, palpitations or peripheral edema  GI: NEGATIVE for nausea, abdominal pain, heartburn, or change in bowel habits   male: negative for dysuria, hematuria, erectile dysfunction, urethral discharge. Has mild  decreased urinary stream. On Flomax. Hx   elevated PSA. Had recheck with Urology after last elevation and  Slightly  <4 per pt. Needs recheck PSA before f/u urology appt in a couple weeks  MUSCULOSKELETAL: NEGATIVE for significant arthralgias or myalgia  NEURO: NEGATIVE for weakness, dizziness or paresthesias  PSYCHIATRIC: NEGATIVE for changes in mood or affect    OBJECTIVE:   /78   Pulse 60   Temp 98.7  F (37.1  C) (Temporal)   Resp 16   Ht 1.854 m (6' 1\")   Wt 84.4 kg (186 lb)   SpO2 96%   BMI 24.54 kg/m      Physical Exam  General appearance - healthy, alert, no distress  Skin - No rashes or lesions. Chronic vitiligo pigment changes scrotum  Head - normocephalic, atraumatic  Eyes - LILLY, EOMI, fundi exam with nondilated pupils negative.  Ears - External ears normal. Canals clear. TM's normal.  Nose/Sinuses - Nares normal. Septum midline. Mucosa normal. No drainage or sinus tenderness.  Oropharynx - No erythema, no adenopathy, no exudates. Slight narrowing OP airway. Normall uvula size  Neck - Supple " without adenopathy or thyromegaly. No bruits.  Lungs - Clear to auscultation without wheezes/rhonchi.  Heart - Regular rate and rhythm without murmurs, clicks, or gallops.  Nodes - No supraclavicular, axillary, or inguinal adenopathy palpable.  Abdomen - Abdomen soft, non-tender. BS normal. No masses or hepatosplenomegaly palpable. No bruits.  Extremities -No cyanosis, clubbing or edema.    Musculoskeletal - Spine ROM normal. Muscular strength intact.   Peripheral pulses - radial=4/4, femoral=4/4, posterior tibial=4/4, dorsalis pedis=4/4,  Neuro - Gait normal. Reflexes normal and symmetric. Sensation grossly WNL.  Genital - Normal-appearing male external genitalia. No scrotal masses or inguinal hernia palpable.   Rectal - Guaic negative stool. Normal tone. Prostate 2 plus in size to palpation. No rectal masses or prostate nodularity palpable      ASSESSMENT/PLAN:   1. Encounter for routine adult medical exam with abnormal findings   Screening labs  as ordered.  Other HCM UTD for now. Will be due for colonoscopy screening in Spring 2020. Informed pt I have placed reminder note to be sent to MD in [facility] in April to place order then  - Comprehensive metabolic panel  - Lipid panel reflex to direct LDL Fasting  - TSH with free T4 reflex  - Testosterone Free and Total  - T4 free  - T4 free    2. Benign non-nodular prostatic hyperplasia with lower urinary tract symptoms  ON Flomax through Urology and working well  Per pt. Discussed option of possible Avodart in future if sx worsen     3. Screening for prostate cancer  . Last PSA mildly increased but then normalized per pt and due for recheck. Lab as ordered. HAS f/u with Urology as scheduled in a couple weeks  - Prostate spec antigen screen    4. Snoring  Probable PRASHANT. Pt will try tennis balls in back of [facility] or similar to keep him on his side to see if resolved issue. If not, he will let me know and will refer to Sleep clinic for study. HAS good daytime energy now and  "not related to days will have ETOH at night. Discussed hear arrhythmia, concentration issues,incrsed stroke risk, etc if untreated PRASHANT present. He will email if referral needed      COUNSELING:   Reviewed preventive health counseling, as reflected in patient instructions    Estimated body mass index is 24.27 kg/m  as calculated from the following:    Height as of 1/15/18: 1.88 m (6' 2\").    Weight as of 6/14/18: 85.7 kg (189 lb).          reports that he has never smoked. He has never used smokeless tobacco.      Counseling Resources:  ATP IV Guidelines  Pooled Cohorts Equation Calculator  FRAX Risk Assessment  ICSI Preventive Guidelines  Dietary Guidelines for Americans, 2010  USDA's MyPlate  ASA Prophylaxis  Lung CA Screening    Zhao Mitchell MD  [facility]  "

## 2019-11-01 ENCOUNTER — MYC MEDICAL ADVICE (OUTPATIENT)
Dept: INTERNAL MEDICINE | Facility: CLINIC | Age: 59
End: 2019-11-01

## 2019-11-01 DIAGNOSIS — Z13.6 CARDIOVASCULAR SCREENING; LDL GOAL LESS THAN 130: ICD-10-CM

## 2019-11-01 DIAGNOSIS — E03.9 HYPOTHYROIDISM, UNSPECIFIED TYPE: Primary | ICD-10-CM

## 2019-11-04 ENCOUNTER — MYC MEDICAL ADVICE (OUTPATIENT)
Dept: INTERNAL MEDICINE | Facility: CLINIC | Age: 59
End: 2019-11-04

## 2019-11-05 LAB
SHBG SERPL-SCNC: 49 NMOL/L (ref 11–80)
TESTOST FREE SERPL-MCNC: 8.89 NG/DL (ref 4.7–24.4)
TESTOST SERPL-MCNC: 542 NG/DL (ref 240–950)

## 2019-11-05 NOTE — TELEPHONE ENCOUNTER
MD please explain reason for testing thyroid and also if patient should start an antibiotic for prostate level ?.Violeta Martínez RN

## 2019-11-08 ENCOUNTER — MYC MEDICAL ADVICE (OUTPATIENT)
Dept: INTERNAL MEDICINE | Facility: CLINIC | Age: 59
End: 2019-11-08

## 2019-11-08 DIAGNOSIS — E03.9 HYPOTHYROIDISM, UNSPECIFIED TYPE: Primary | ICD-10-CM

## 2019-11-08 NOTE — TELEPHONE ENCOUNTER
Will forward patient's Mychart communication to Dr. Mitchell so he is aware patient reviewed the message.    Attempted to reach patient to provide Jose Rico's scheduling line just in case patient doesn't hear from ultrasound to schedule. No answer. If patient calls back, please give him Jose Rico's scheduling line phone number at [phone]. Thank you.    Mirtha Andrew RN on 11/8/2019 at 9:46 AM

## 2019-11-15 ENCOUNTER — HOSPITAL ENCOUNTER (OUTPATIENT)
Dept: ULTRASOUND IMAGING | Facility: CLINIC | Age: 59
Discharge: HOME | End: 2019-11-15
Attending: INTERNAL MEDICINE | Admitting: INTERNAL MEDICINE
Payer: COMMERCIAL

## 2019-11-15 DIAGNOSIS — E03.9 HYPOTHYROIDISM, UNSPECIFIED TYPE: ICD-10-CM

## 2019-11-15 PROCEDURE — 76536 US EXAM OF HEAD AND NECK: CPT

## 2019-11-18 ENCOUNTER — MYC MEDICAL ADVICE (OUTPATIENT)
Dept: INTERNAL MEDICINE | Facility: CLINIC | Age: 59
End: 2019-11-18

## 2019-11-18 DIAGNOSIS — E04.1 THYROID NODULE: Primary | ICD-10-CM

## 2019-11-22 ENCOUNTER — HOSPITAL ENCOUNTER (OUTPATIENT)
Dept: ULTRASOUND IMAGING | Facility: CLINIC | Age: 59
End: 2019-11-22
Attending: INTERNAL MEDICINE
Payer: COMMERCIAL

## 2019-11-22 ENCOUNTER — HOSPITAL ENCOUNTER (OUTPATIENT)
Facility: CLINIC | Age: 59
Discharge: HOME | End: 2019-11-22
Admitting: RADIOLOGY
Payer: COMMERCIAL

## 2019-11-22 VITALS
OXYGEN SATURATION: 95 % | DIASTOLIC BLOOD PRESSURE: 79 MMHG | HEART RATE: 58 BPM | SYSTOLIC BLOOD PRESSURE: 121 MMHG | RESPIRATION RATE: 16 BRPM

## 2019-11-22 DIAGNOSIS — E04.1 THYROID NODULE: ICD-10-CM

## 2019-11-22 PROCEDURE — 88173 CYTOPATH EVAL FNA REPORT: CPT | Mod: 26

## 2019-11-22 PROCEDURE — 40000863 ZZH STATISTIC RADIOLOGY XRAY, US, CT, MAR, NM

## 2019-11-22 PROCEDURE — 10005 FNA BX W/US GDN 1ST LES: CPT

## 2019-11-22 PROCEDURE — 88173 CYTOPATH EVAL FNA REPORT: CPT

## 2019-11-22 NOTE — PROGRESS NOTES
Care Suites Admission Nursing Note    Reason for admission: biopsy  CS arrival time: 1400  Accompanied by: self  Name/phone of DC : self  Medications held: none  Consent signed: to be obtained  Abnormal assessment/labs: N/A  If abnormal, provider notified: N/A  Education/questions answered: yes   Plan: per procedural plan of care

## 2019-11-22 NOTE — PROGRESS NOTES
Care Suites Discharge Nursing Note    Education/questions answered: Yes.  Discharge instructions reviewed and questions answered.    Patient DC location: Back to work for a meeting than home.      Accompanied by: self  CS discharge time: ~1430

## 2019-11-22 NOTE — DISCHARGE INSTRUCTIONS
Thyroid/Lymph Node Biopsy Discharge Instructions     After you go home:      You may resume your normal diet    Care of Puncture Site:      You may have mild bruising, soreness & swelling at the puncture site. This will go away in a few days    For swelling & bruising, you may use an ice pack on the site.     Activity:      You may go back to your normal activity    Avoid strenuous activity for 24 hours    Medicines:      You may resume all medications    For minor pain, you may take Acetaminophen (Tylenol) or Ibuprofen (Advil)            Call the provider who ordered this test if:      Increased redness or swelling at the site    Fluid or blood oozing from the site    Severe pain at the site    Chills or a fever greater than 101 F (38 C).    Any questions or concerns    Call  911 or go to the Emergency Room if:      Trouble breathing    Your neck swells    Bleeding that you cannot control    Severe difficulty swallowing    If you have questions call:      [facility] @ [phone]

## 2019-11-26 LAB — COPATH REPORT: NORMAL

## 2019-12-19 ENCOUNTER — TRANSFERRED RECORDS (OUTPATIENT)
Dept: HEALTH INFORMATION MANAGEMENT | Facility: CLINIC | Age: 59
End: 2019-12-19

## 2019-12-24 ENCOUNTER — TRANSFERRED RECORDS (OUTPATIENT)
Dept: HEALTH INFORMATION MANAGEMENT | Facility: CLINIC | Age: 59
End: 2019-12-24

## 2020-01-10 DIAGNOSIS — Z13.6 CARDIOVASCULAR SCREENING; LDL GOAL LESS THAN 130: ICD-10-CM

## 2020-01-10 DIAGNOSIS — E03.9 HYPOTHYROIDISM, UNSPECIFIED TYPE: ICD-10-CM

## 2020-01-10 LAB
CHOLEST SERPL-MCNC: 151 MG/DL
HDLC SERPL-MCNC: 40 MG/DL
LDLC SERPL CALC-MCNC: 99 MG/DL
NONHDLC SERPL-MCNC: 111 MG/DL
T3FREE SERPL-MCNC: 2.3 PG/ML (ref 2.3–4.2)
T4 FREE SERPL-MCNC: 0.73 NG/DL (ref 0.76–1.46)
TRIGL SERPL-MCNC: 59 MG/DL
TSH SERPL DL<=0.005 MIU/L-ACNC: 7.52 MU/L (ref 0.4–4)

## 2020-01-10 PROCEDURE — 80061 LIPID PANEL: CPT | Performed by: INTERNAL MEDICINE

## 2020-01-10 PROCEDURE — 36415 COLL VENOUS BLD VENIPUNCTURE: CPT | Performed by: INTERNAL MEDICINE

## 2020-01-10 PROCEDURE — 86376 MICROSOMAL ANTIBODY EACH: CPT | Performed by: INTERNAL MEDICINE

## 2020-01-10 PROCEDURE — 84439 ASSAY OF FREE THYROXINE: CPT | Performed by: INTERNAL MEDICINE

## 2020-01-10 PROCEDURE — 86800 THYROGLOBULIN ANTIBODY: CPT | Performed by: INTERNAL MEDICINE

## 2020-01-10 PROCEDURE — 84443 ASSAY THYROID STIM HORMONE: CPT | Performed by: INTERNAL MEDICINE

## 2020-01-10 PROCEDURE — 84481 FREE ASSAY (FT-3): CPT | Performed by: INTERNAL MEDICINE

## 2020-01-13 LAB
THYROGLOB AB SERPL IA-ACNC: 152 IU/ML (ref 0–40)
THYROPEROXIDASE AB SERPL-ACNC: 2274 IU/ML

## 2020-01-17 ENCOUNTER — TELEPHONE (OUTPATIENT)
Dept: INTERNAL MEDICINE | Facility: CLINIC | Age: 60
End: 2020-01-17

## 2020-01-17 DIAGNOSIS — E03.9 HYPOTHYROIDISM, UNSPECIFIED TYPE: Primary | ICD-10-CM

## 2020-01-17 NOTE — TELEPHONE ENCOUNTER
Reason for Call:  Other call back    Detailed comments: Please call. patient wants to discuss lab    Phone Number Patient can be reached at: Home number on file [phone] (home)    Best Time: any    Can we leave a detailed message on this number? YES    Call taken on 1/17/2020 at 10:50 AM by PREM BECK

## 2020-01-21 RX ORDER — LEVOTHYROXINE SODIUM 88 UG/1
88 TABLET ORAL DAILY
Qty: 30 TABLET | Refills: 11 | Status: SHIPPED | OUTPATIENT
Start: 2020-01-21 | End: 2020-09-02

## 2020-01-26 NOTE — TELEPHONE ENCOUNTER
Spoke with pt 1/20/20 and discussed results. Also see  message 1/25/20 re-sumamrizing results in writing

## 2020-04-16 ENCOUNTER — TRANSFERRED RECORDS (OUTPATIENT)
Dept: HEALTH INFORMATION MANAGEMENT | Facility: CLINIC | Age: 60
End: 2020-04-16

## 2020-09-02 DIAGNOSIS — Z13.6 CARDIOVASCULAR SCREENING; LDL GOAL LESS THAN 100: Primary | ICD-10-CM

## 2020-09-02 DIAGNOSIS — E03.9 HYPOTHYROIDISM, UNSPECIFIED TYPE: ICD-10-CM

## 2020-09-02 DIAGNOSIS — Z13.1 SCREENING FOR DIABETES MELLITUS: ICD-10-CM

## 2020-09-02 LAB — TSH SERPL DL<=0.005 MIU/L-ACNC: 1.44 MU/L (ref 0.4–4)

## 2020-09-02 PROCEDURE — 84443 ASSAY THYROID STIM HORMONE: CPT | Performed by: INTERNAL MEDICINE

## 2020-09-02 PROCEDURE — 36415 COLL VENOUS BLD VENIPUNCTURE: CPT | Performed by: INTERNAL MEDICINE

## 2020-09-02 RX ORDER — LEVOTHYROXINE SODIUM 88 UG/1
88 TABLET ORAL DAILY
Qty: 90 TABLET | Refills: 3 | Status: SHIPPED | OUTPATIENT
Start: 2020-09-02 | End: 2020-09-05

## 2020-09-03 DIAGNOSIS — E03.9 HYPOTHYROIDISM, UNSPECIFIED TYPE: ICD-10-CM

## 2020-09-04 NOTE — TELEPHONE ENCOUNTER
Patient requests rx sent to new pharmacy. RN able to approve for short supply. Routing to provider to send for longer supply (as sent on 9/2/20).

## 2020-09-05 RX ORDER — LEVOTHYROXINE SODIUM 88 UG/1
88 TABLET ORAL DAILY
Qty: 90 TABLET | Refills: 3 | Status: SHIPPED | OUTPATIENT
Start: 2020-09-05 | End: 2021-01-18

## 2020-11-07 ENCOUNTER — HEALTH MAINTENANCE LETTER (OUTPATIENT)
Age: 60
End: 2020-11-07

## 2021-01-15 ENCOUNTER — HEALTH MAINTENANCE LETTER (OUTPATIENT)
Age: 61
End: 2021-01-15

## 2021-01-18 ENCOUNTER — OFFICE VISIT (OUTPATIENT)
Dept: INTERNAL MEDICINE | Facility: CLINIC | Age: 61
End: 2021-01-18
Payer: COMMERCIAL

## 2021-01-18 VITALS
RESPIRATION RATE: 16 BRPM | HEART RATE: 62 BPM | WEIGHT: 179 LBS | HEIGHT: 74 IN | DIASTOLIC BLOOD PRESSURE: 70 MMHG | BODY MASS INDEX: 22.97 KG/M2 | SYSTOLIC BLOOD PRESSURE: 118 MMHG | OXYGEN SATURATION: 95 % | TEMPERATURE: 97.1 F

## 2021-01-18 DIAGNOSIS — F43.9 STRESS: ICD-10-CM

## 2021-01-18 DIAGNOSIS — E03.9 HYPOTHYROIDISM, UNSPECIFIED TYPE: ICD-10-CM

## 2021-01-18 DIAGNOSIS — Z13.1 SCREENING FOR DIABETES MELLITUS: ICD-10-CM

## 2021-01-18 DIAGNOSIS — R97.20 ELEVATED PROSTATE SPECIFIC ANTIGEN (PSA): ICD-10-CM

## 2021-01-18 DIAGNOSIS — N40.1 BENIGN NON-NODULAR PROSTATIC HYPERPLASIA WITH LOWER URINARY TRACT SYMPTOMS: ICD-10-CM

## 2021-01-18 DIAGNOSIS — Z00.01 ENCOUNTER FOR ROUTINE ADULT MEDICAL EXAM WITH ABNORMAL FINDINGS: Primary | ICD-10-CM

## 2021-01-18 DIAGNOSIS — Z12.11 SPECIAL SCREENING FOR MALIGNANT NEOPLASMS, COLON: ICD-10-CM

## 2021-01-18 DIAGNOSIS — Z13.6 CARDIOVASCULAR SCREENING; LDL GOAL LESS THAN 100: ICD-10-CM

## 2021-01-18 DIAGNOSIS — M54.2 CERVICALGIA: ICD-10-CM

## 2021-01-18 LAB
ALBUMIN SERPL-MCNC: 3.7 G/DL (ref 3.4–5)
ALP SERPL-CCNC: 57 U/L (ref 40–150)
ALT SERPL W P-5'-P-CCNC: 26 U/L (ref 0–70)
ANION GAP SERPL CALCULATED.3IONS-SCNC: <1 MMOL/L (ref 3–14)
AST SERPL W P-5'-P-CCNC: 24 U/L (ref 0–45)
BILIRUB SERPL-MCNC: 0.8 MG/DL (ref 0.2–1.3)
BUN SERPL-MCNC: 16 MG/DL (ref 7–30)
CALCIUM SERPL-MCNC: 8.6 MG/DL (ref 8.5–10.1)
CHLORIDE SERPL-SCNC: 109 MMOL/L (ref 94–109)
CHOLEST SERPL-MCNC: 169 MG/DL
CO2 SERPL-SCNC: 31 MMOL/L (ref 20–32)
CREAT SERPL-MCNC: 1.22 MG/DL (ref 0.66–1.25)
GFR SERPL CREATININE-BSD FRML MDRD: 64 ML/MIN/{1.73_M2}
GLUCOSE SERPL-MCNC: 84 MG/DL (ref 70–99)
HDLC SERPL-MCNC: 42 MG/DL
LDLC SERPL CALC-MCNC: 112 MG/DL
NONHDLC SERPL-MCNC: 127 MG/DL
POTASSIUM SERPL-SCNC: 4.2 MMOL/L (ref 3.4–5.3)
PROT SERPL-MCNC: 7.2 G/DL (ref 6.8–8.8)
PSA SERPL-ACNC: 4.14 UG/L (ref 0–4)
SODIUM SERPL-SCNC: 140 MMOL/L (ref 133–144)
TRIGL SERPL-MCNC: 74 MG/DL
TSH SERPL DL<=0.005 MIU/L-ACNC: 1.62 MU/L (ref 0.4–4)

## 2021-01-18 PROCEDURE — 80053 COMPREHEN METABOLIC PANEL: CPT | Performed by: INTERNAL MEDICINE

## 2021-01-18 PROCEDURE — 84443 ASSAY THYROID STIM HORMONE: CPT | Performed by: INTERNAL MEDICINE

## 2021-01-18 PROCEDURE — 36415 COLL VENOUS BLD VENIPUNCTURE: CPT | Performed by: INTERNAL MEDICINE

## 2021-01-18 PROCEDURE — G0103 PSA SCREENING: HCPCS | Performed by: INTERNAL MEDICINE

## 2021-01-18 PROCEDURE — 99396 PREV VISIT EST AGE 40-64: CPT | Performed by: INTERNAL MEDICINE

## 2021-01-18 PROCEDURE — 80061 LIPID PANEL: CPT | Performed by: INTERNAL MEDICINE

## 2021-01-18 PROCEDURE — 99214 OFFICE O/P EST MOD 30 MIN: CPT | Mod: 25 | Performed by: INTERNAL MEDICINE

## 2021-01-18 RX ORDER — LEVOTHYROXINE SODIUM 88 UG/1
88 TABLET ORAL DAILY
Qty: 90 TABLET | Refills: 3 | Status: SHIPPED | OUTPATIENT
Start: 2021-01-18 | End: 2021-07-05

## 2021-01-18 ASSESSMENT — MIFFLIN-ST. JEOR: SCORE: 1691.69

## 2021-01-18 NOTE — PATIENT INSTRUCTIONS
Continue current meds pending lab results  Labs as ordered  Colonoscopy  1/27/21 with  [facility] as scheduled   Heat/stretching in AM for neck. If not improving, then Email me and  will refer to [facility] for physical therapy  Worksite ergonomic evaluation   Work on stress reduction techniques. Possible future therapy/counseling. Email me if wish to have referral  Will await PSA. 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     Pt was informed regarding extra E&M billing for management of new or established medical issues not related to today's wellness visit

## 2021-01-18 NOTE — PROGRESS NOTES
SUBJECTIVE:   CC: Vaughn Henderson is an 60 year old male who presents for preventative health visit and follow-up re: hypothyroidism, BPH and history of elevated PSA.     {  Healthy Habits:     Getting at least 3 servings of Calcium per day:  NO    Bi-annual eye exam:  Yes    Dental care twice a year:  NO    Sleep apnea or symptoms of sleep apnea:  None    Diet:  Regular (no restrictions)    Frequency of exercise:  1 day/week    Duration of exercise:  45-60 minutes    Taking medications regularly:  Yes    Medication side effects:  None    PHQ-2 Total Score: 0    Additional concerns today:  Yes          Today's PHQ-2 Score:   PHQ-2 ( 1999 Pfizer) 1/17/2021   Q1: Little interest or pleasure in doing things 0   Q2: Feeling down, depressed or hopeless 0   PHQ-2 Score 0   Q1: Little interest or pleasure in doing things Not at all   Q2: Feeling down, depressed or hopeless Not at all   PHQ-2 Score 0       Abuse: Current or Past(Physical, Sexual or Emotional)- No  Do you feel safe in your environment? Yes        Social History     Tobacco Use     Smoking status: Never Smoker     Smokeless tobacco: Never Used   Substance Use Topics     Alcohol use: Yes     Comment: socially     If you drink alcohol do you typically have >3 drinks per day or >7 drinks per week? No    Alcohol Use 1/17/2021   Prescreen: >3 drinks/day or >7 drinks/week? No   Prescreen: >3 drinks/day or >7 drinks/week? -       Last PSA:   PSA   Date Value Ref Range Status   10/31/2019 7.55 (H) 0 - 4 ug/L Final     Comment:     Assay Method:  Chemiluminescence using Siemens Vista analyzer       Reviewed orders with patient. Reviewed health maintenance and updated orders accordingly - Yes  Labs reviewed in Epic     PSA  April 2020    Reviewed and updated as needed this visit by clinical staff                 Reviewed and updated as needed this visit by Provider                    Review of Systems  CONSTITUTIONAL: NEGATIVE for fever, chills. Weight down 7 pounds in  "1 year  INTEGUMENTARY/SKIN: NEGATIVE for worrisome rashes, moles or lesions  EYES: NEGATIVE for vision changes or irritation  ENT: NEGATIVE for ear, mouth and throat problems  RESP: NEGATIVE for significant cough or SOB  CV: NEGATIVE for chest pain, palpitations or peripheral edema  GI: NEGATIVE for nausea, abdominal pain, heartburn, or change in bowel habits   male: negative for dysuria, hematuria,  erectile dysfunction, urethral discharge. Hx slow stream and nocturia 1-2/night. Hx elevated PSA.  Patient [facility]  Last seen in April 2020.  At that time, patient's PSA dropped from 7.5-4.2.  Patient had undergone prostate biopsy in December 2019.  No cancer was seen but patient was found to have high-grade prostate intraepithelial neoplasia seen which could be a marker for increased prostate cancer risk.  Note from April 2020 recommended repeating a PSA again 4 months later and MRI of the prostate.  Patient has not had follow-up with urology since that April 2020 visit  MUSCULOSKELETAL: NEGATIVE for significant arthralgias or myalgia excerpt for some neck soreness. Reduced ROM right. Has tried different pillows. Worse past 2 mos  NEURO: NEGATIVE for weakness, dizziness or paresthesias  PSYCHIATRIC:  POSITIVE for increased stress with work. Not exercising recently with club closed.  Denies active depression or need for medication at this time.    OBJECTIVE:   /70   Pulse 62   Temp 97.1  F (36.2  C) (Temporal)   Resp 16   Ht 1.88 m (6' 2\")   Wt 81.2 kg (179 lb)   SpO2 95%   BMI 22.98 kg/m      Physical Exam  General appearance - healthy, alert, no distress  Skin - No rashes or lesions.  Head - normocephalic, atraumatic  Eyes - LILLY, EOMI, fundi exam with nondilated pupils negative.  Ears - External ears normal. Canals clear. TM's normal.  Nose/Sinuses - Nares normal. Septum midline. Mucosa normal. No drainage or sinus tenderness.  Oropharynx - No erythema, no " adenopathy, no exudates.  Neck - Supple without adenopathy or thyromegaly. 1cm thyroid nodule palpable isthmus (chronic) and unchanged in size from previous).  No bruits.  Lungs - Clear to auscultation without wheezes/rhonchi.  Heart - Regular rate and rhythm without murmurs, clicks, or gallops.  Nodes - No supraclavicular, axillary, or inguinal adenopathy palpable.  Abdomen - Abdomen soft, non-tender. BS normal. No masses or hepatosplenomegaly palpable. No bruits.  Extremities -No cyanosis, clubbing or edema.    Musculoskeletal - Spine ROM normal. Muscular strength intact.  Tenderness to palpation right occipital strap muscle attachment point and reduced range of motion to right cervical lateral movement.  Normal left cervical range of motion and to flexion/extension  Peripheral pulses - radial=4/4, femoral=4/4, posterior tibial=4/4, dorsalis pedis=4/4,  Neuro - Gait normal. Reflexes normal and symmetric. Sensation grossly WNL.  Genital - Normal-appearing male external genitalia. No scrotal masses or inguinal hernia palpable.   Rectal - Guaic negative stool. Normal tone. Prostate 2-3+ in size to palpation. No rectal masses or prostate nodularity palpable.  Prostate nontender to palpation        ASSESSMENT/PLAN:   1. Encounter for routine adult medical exam with abnormal findings  Due for colonoscopy which is scheduled for later this month.  Other healthcare maintenance including vaccinations up-to-date.  Future Covid vaccination when available    2. Hypothyroidism, unspecified type  Previous TSH at goal below recent weight loss.  Follow-up lab today as ordered.  Continue current medication pending lab result  - TSH with free T4 reflex  - levothyroxine (SYNTHROID/LEVOTHROID) 88 MCG tablet; Take 1 tablet (88 mcg) by mouth daily  Dispense: 90 tablet; Refill: 3    3. Benign non-nodular prostatic hyperplasia with lower urinary tract symptoms  On Flomax.  Occasional nocturia still.  Discussed option of possible addition  of finasteride.  Patient also followed by urology so defer management to them    4. 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  Urology had discussed doing  follow-up PSA and MRI of the prostate last Fall.  We will repeat PSA to see if remains stable after previous jump.  Patient to follow-up with urology to discuss MRI imaging in addition at this time given the HGPIN versus continued observation only  - Prostate spec antigen screen    5. Cervicalgia  Denies any cervical radiculopathy.  No weakness in the upper extremities.  Likely related to stress, ergonomic positioning of his work space, etc.  Counseled regarding neck range of motion stretching exercises.  Heat in the morning as needed to relax musculature.  Patient will ergonomically evaluate his worksite.  If symptoms persist, patient to inform physician and will refer for physical therapy.  Defer imaging at this time as would not     6. Stress  Patient has high work stress.  Has affected his eating some and led to some weight loss.  Patient states he is able to talk freely with his wife and there history of assistant regarding how he is doing.  Offered option of counseling but patient claims at this time.  Patient denies true generalized anxiety or depression.  Counseled him to try and seek out x4 relaxation, exercise and other activities to de-stress himself and to also increase caloric intake.  Patient encouraged to monitor weight and if dropping further, to inform physician    7. Screening for diabetes mellitus  - Comprehensive metabolic panel    8. CARDIOVASCULAR SCREENING; LDL GOAL LESS THAN 100  - Lipid panel reflex to direct LDL Fasting    9. Special screening for malignant neoplasms, colon  Previous history of colon polyp.  Due for follow-up  "screening.  Has colonoscopy scheduled for later this month with [facility] [place] office.  Will await report      Patient has been advised of split billing requirements and indicates understanding: Yes       COUNSELING:   Reviewed preventive health counseling, as reflected in patient instructions    Estimated body mass index is 24.54 kg/m  as calculated from the following:    Height as of 10/31/19: 1.854 m (6' 1\").    Weight as of 10/31/19: 84.4 kg (186 lb).         He reports that he has never smoked. He has never used smokeless tobacco.      Counseling Resources:  ATP IV Guidelines  Pooled Cohorts Equation Calculator  FRAX Risk Assessment  ICSI Preventive Guidelines  Dietary Guidelines for Americans, 2010  [employer]'s MyPlate  ASA Prophylaxis  Lung CA Screening      PLAN:  Continue current meds pending lab results  Labs as ordered  Colonoscopy  1/27/21 with  [facility] as scheduled   Heat/stretching in AM for neck. If not improving, then Email me and  will refer to [facility] for physical therapy  Worksite ergonomic evaluation   Work on stress reduction techniques. Possible future therapy/counseling. Email me if wish to have referral  Will await PSA. Possible MRI prostate with Urology given previous history  of high-grade prostate intraepithelial neoplasia seen on prior prostate bx which could be a marker for increased prostate cancer risk.     Pt was informed regarding extra E&M billing for management of new or established medical issues not related to today's wellness visit      Zhao Mitchell MD  [facility]        "

## 2021-01-27 ENCOUNTER — TRANSFERRED RECORDS (OUTPATIENT)
Dept: HEALTH INFORMATION MANAGEMENT | Facility: CLINIC | Age: 61
End: 2021-01-27

## 2021-03-28 DIAGNOSIS — Z13.1 SCREENING FOR DIABETES MELLITUS: ICD-10-CM

## 2021-03-28 DIAGNOSIS — Z00.00 LABORATORY EXAMINATION ORDERED AS PART OF A ROUTINE GENERAL MEDICAL EXAMINATION: ICD-10-CM

## 2021-03-28 DIAGNOSIS — Z12.5 SCREENING FOR PROSTATE CANCER: Primary | ICD-10-CM

## 2021-03-28 DIAGNOSIS — Z13.6 CARDIOVASCULAR SCREENING; LDL GOAL LESS THAN 100: ICD-10-CM

## 2021-03-28 DIAGNOSIS — E03.9 HYPOTHYROIDISM, UNSPECIFIED TYPE: ICD-10-CM

## 2021-05-13 ENCOUNTER — OFFICE VISIT (OUTPATIENT)
Dept: INTERNAL MEDICINE | Facility: CLINIC | Age: 61
End: 2021-05-13
Payer: COMMERCIAL

## 2021-05-13 VITALS
HEART RATE: 66 BPM | WEIGHT: 185 LBS | BODY MASS INDEX: 23.75 KG/M2 | OXYGEN SATURATION: 97 % | TEMPERATURE: 97.4 F | SYSTOLIC BLOOD PRESSURE: 112 MMHG | DIASTOLIC BLOOD PRESSURE: 72 MMHG | RESPIRATION RATE: 16 BRPM

## 2021-05-13 DIAGNOSIS — M54.2 CERVICALGIA: Primary | ICD-10-CM

## 2021-05-13 PROCEDURE — 99213 OFFICE O/P EST LOW 20 MIN: CPT | Performed by: INTERNAL MEDICINE

## 2021-05-13 NOTE — PATIENT INSTRUCTIONS
Continue range of motion neck stretching exercises in AM. Heat as needed  If symptoms persist in 2 weeks or any worsening prior to that, pt will contact me and will order imaging of the neck  Continue current medications

## 2021-05-13 NOTE — PROGRESS NOTES
ASSESSMENT:   1. Cervicalgia   Good strong carotid pulses and no bruit. No LAD or new neuro sx. No dysphagia. Has chronic reduced ROM right and has been working on exercises. Sx likely related to cervical strap muscle strain/spasms. Pt to will continue  ROM stretching and heat in AM and monitor over the next week or so. If sx not resolved in the next 10-14 days or worsen before then, he will contact me and will get imaging of the neck       PLAN:  Continue range of motion neck stretching exercises in AM. Heat as needed  If symptoms persist in 2 weeks or any worsening prior to that, pt will contact me and will order imaging of the neck  Continue current medications    (Chart documentation was completed, in part, with [employer] voice-recognition software. Even though reviewed, some grammatical, spelling, and word errors may remain.)    Zhao Mitchell MD  Internal Medicine Department  [facility] CLAIRE Mendes is a 61 year old who presents for the following health issues     HPI   Chief Complaint   Patient presents with     Neck Problem     Patient c/o numbing and tingling on the right side neck      Neck Pain  Onset/Duration: 10 days  Description:   Location: right side   Radiation: none  Intensity: moderate  Progression of Symptoms:  intermittent  Accompanying Signs & Symptoms:  Burning, tingling, prickly sensation in arm(s): no  Numbness in arm(s): no  Weakness in arm(s):  no  Fever: no  Headache: no  Nausea and/or vomiting: no  History:   Trauma: no  Previous neck pain: no  Previous surgery or injections: no  Previous Imaging (MRI,X ray): no  Precipitating or alleviating factors: None  Does movement impact the pain:  no  Therapies tried and outcome: stretching and exercises-No Relief     Most recent lab results reviewed with pt.        Sx 7-10 days. Will get tingling  sensation  Right mid neck for a few secs and then goes away. Rare sense of numbness for a few secs  No  "vision changes. No numbness/weakness in extremities. No headache  No sinus/ear/nasal sx.   Hx some chronic MSK neck issues. Has good ROM left  And to F/E but limited ROM right       Additional ROS:   Constitutional, HEENT, Cardiovascular, Pulmonary, GI and , Neuro, MSK and Psych review of systems/symptoms are otherwise negative or unchanged from previous, except as noted above.      OBJECTIVE:  /72   Pulse 66   Temp 97.4  F (36.3  C) (Temporal)   Resp 16   Wt 83.9 kg (185 lb)   SpO2 97%   BMI 23.75 kg/m     Estimated body mass index is 23.75 kg/m  as calculated from the following:    Height as of 1/18/21: 1.88 m (6' 2\").    Weight as of this encounter: 83.9 kg (185 lb).  Eye: PERRL, EOMI  HENT: ear canals and TM's normal and nose and mouth without ulcers or lesions   Neck: no adenopathy. Small thyroid  isthmus nodule unchanged from prior exam.  No bruits. Strong carotid pulse bilaterally  Pulm: Lungs clear to auscultation   CV: Regular rates and rhythm   Neuro: Normal strength and tone, sensory exam grossly normal BUE  MSK:  Reduced ROM (approx 70% ROM)  right lateral neck.  Normal FROM left and to F/E neck. Musculature of neck NT to palpation            "

## 2021-07-03 DIAGNOSIS — E03.9 HYPOTHYROIDISM, UNSPECIFIED TYPE: ICD-10-CM

## 2021-07-05 RX ORDER — LEVOTHYROXINE SODIUM 88 UG/1
TABLET ORAL
Qty: 90 TABLET | Refills: 1 | Status: SHIPPED | OUTPATIENT
Start: 2021-07-05 | End: 2021-12-20

## 2021-09-05 ENCOUNTER — HEALTH MAINTENANCE LETTER (OUTPATIENT)
Age: 61
End: 2021-09-05

## 2021-12-17 DIAGNOSIS — E03.9 HYPOTHYROIDISM, UNSPECIFIED TYPE: ICD-10-CM

## 2021-12-20 RX ORDER — LEVOTHYROXINE SODIUM 88 UG/1
TABLET ORAL
Qty: 90 TABLET | Refills: 0 | Status: SHIPPED | OUTPATIENT
Start: 2021-12-20 | End: 2022-03-07

## 2022-01-27 ENCOUNTER — TRANSFERRED RECORDS (OUTPATIENT)
Dept: HEALTH INFORMATION MANAGEMENT | Facility: CLINIC | Age: 62
End: 2022-01-27
Payer: COMMERCIAL

## 2022-02-20 ENCOUNTER — HEALTH MAINTENANCE LETTER (OUTPATIENT)
Age: 62
End: 2022-02-20

## 2022-03-06 DIAGNOSIS — E03.9 HYPOTHYROIDISM, UNSPECIFIED TYPE: ICD-10-CM

## 2022-03-07 RX ORDER — LEVOTHYROXINE SODIUM 88 UG/1
TABLET ORAL
Qty: 90 TABLET | Refills: 0 | Status: SHIPPED | OUTPATIENT
Start: 2022-03-07 | End: 2022-06-01

## 2022-05-30 DIAGNOSIS — E03.9 HYPOTHYROIDISM, UNSPECIFIED TYPE: ICD-10-CM

## 2022-06-01 RX ORDER — LEVOTHYROXINE SODIUM 88 UG/1
TABLET ORAL
Qty: 90 TABLET | Refills: 0 | Status: SHIPPED | OUTPATIENT
Start: 2022-06-01 | End: 2022-09-08

## 2022-06-01 NOTE — TELEPHONE ENCOUNTER
Medication refilled for 90 days.  [facility] and appointment with me

## 2022-06-01 NOTE — TELEPHONE ENCOUNTER
Routing refill request to provider for review/approval because:  Zahira given x1 and patient did not follow up, please advise      Keyla Norwood RN

## 2022-09-06 DIAGNOSIS — E03.9 HYPOTHYROIDISM, UNSPECIFIED TYPE: ICD-10-CM

## 2022-09-07 NOTE — TELEPHONE ENCOUNTER
Patient Contact    Attempt # 1    Was call answered?  No.  Left message on voicemail with information to call me back.    Upon call back, please assist patient in scheduling a fasting lab appointment and appointment with Dr. Mitchell. Patient has not been seen in the clinic since 6/2021.    Jany Sterling RN

## 2022-09-08 ENCOUNTER — MYC MEDICAL ADVICE (OUTPATIENT)
Dept: INTERNAL MEDICINE | Facility: CLINIC | Age: 62
End: 2022-09-08

## 2022-09-08 RX ORDER — LEVOTHYROXINE SODIUM 88 UG/1
TABLET ORAL
Qty: 90 TABLET | Refills: 0 | Status: SHIPPED | OUTPATIENT
Start: 2022-09-08 | End: 2022-10-03

## 2022-09-08 NOTE — TELEPHONE ENCOUNTER
Routing refill request to provider for review/approval because:  Zahira given x1 and patient did not follow up, please advise    Chris Kramer RN  [facility] Triage Nurse

## 2022-09-16 NOTE — TELEPHONE ENCOUNTER
Please call pt and schedule him to see me the week of 10/3/2022 when he will be in town.  May use same-day appointment slot.  Preferably wish to see patient in the morning so labs can be drawn at that appointment.  Ask patient to come in fasting for the labs but be sure to drink water that morning and take medications as usual

## 2022-09-16 NOTE — TELEPHONE ENCOUNTER
Please see mychart from patient and advise appropriate course of action.    Able to schedule patient sometime the week of October 3-same day spot? Or should patient just plan to schedule an appointment in December?    Jany Sterling RN  [facility] Triage Nurse

## 2022-10-03 ENCOUNTER — MYC MEDICAL ADVICE (OUTPATIENT)
Dept: INTERNAL MEDICINE | Facility: CLINIC | Age: 62
End: 2022-10-03

## 2022-10-03 ENCOUNTER — OFFICE VISIT (OUTPATIENT)
Dept: INTERNAL MEDICINE | Facility: CLINIC | Age: 62
End: 2022-10-03
Payer: COMMERCIAL

## 2022-10-03 VITALS
BODY MASS INDEX: 24.6 KG/M2 | HEIGHT: 73 IN | OXYGEN SATURATION: 99 % | RESPIRATION RATE: 18 BRPM | WEIGHT: 185.6 LBS | DIASTOLIC BLOOD PRESSURE: 68 MMHG | HEART RATE: 70 BPM | TEMPERATURE: 95.5 F | SYSTOLIC BLOOD PRESSURE: 92 MMHG

## 2022-10-03 DIAGNOSIS — Z13.6 CARDIOVASCULAR SCREENING; LDL GOAL LESS THAN 100: ICD-10-CM

## 2022-10-03 DIAGNOSIS — Z00.00 ENCOUNTER FOR ROUTINE ADULT HEALTH EXAMINATION WITHOUT ABNORMAL FINDINGS: Primary | ICD-10-CM

## 2022-10-03 DIAGNOSIS — Z12.5 SCREENING FOR PROSTATE CANCER: ICD-10-CM

## 2022-10-03 DIAGNOSIS — Z13.1 SCREENING FOR DIABETES MELLITUS: ICD-10-CM

## 2022-10-03 DIAGNOSIS — E03.9 HYPOTHYROIDISM, UNSPECIFIED TYPE: ICD-10-CM

## 2022-10-03 DIAGNOSIS — N40.1 BENIGN NON-NODULAR PROSTATIC HYPERPLASIA WITH LOWER URINARY TRACT SYMPTOMS: ICD-10-CM

## 2022-10-03 PROBLEM — R97.20 ELEVATED PROSTATE SPECIFIC ANTIGEN (PSA): Status: RESOLVED | Noted: 2021-01-18 | Resolved: 2022-10-03

## 2022-10-03 PROBLEM — F43.9 STRESS: Status: RESOLVED | Noted: 2021-01-18 | Resolved: 2022-10-03

## 2022-10-03 LAB
ALBUMIN SERPL-MCNC: 3.7 G/DL (ref 3.4–5)
ALP SERPL-CCNC: 54 U/L (ref 40–150)
ALT SERPL W P-5'-P-CCNC: 20 U/L (ref 0–70)
ANION GAP SERPL CALCULATED.3IONS-SCNC: 1 MMOL/L (ref 3–14)
AST SERPL W P-5'-P-CCNC: 23 U/L (ref 0–45)
BILIRUB SERPL-MCNC: 0.8 MG/DL (ref 0.2–1.3)
BUN SERPL-MCNC: 21 MG/DL (ref 7–30)
CALCIUM SERPL-MCNC: 8.9 MG/DL (ref 8.5–10.1)
CHLORIDE BLD-SCNC: 111 MMOL/L (ref 94–109)
CHOLEST SERPL-MCNC: 168 MG/DL
CO2 SERPL-SCNC: 30 MMOL/L (ref 20–32)
CREAT SERPL-MCNC: 1.26 MG/DL (ref 0.66–1.25)
ERYTHROCYTE [DISTWIDTH] IN BLOOD BY AUTOMATED COUNT: 13.1 % (ref 10–15)
FASTING STATUS PATIENT QL REPORTED: YES
GFR SERPL CREATININE-BSD FRML MDRD: 64 ML/MIN/1.73M2
GLUCOSE BLD-MCNC: 96 MG/DL (ref 70–99)
HCT VFR BLD AUTO: 45.6 % (ref 40–53)
HDLC SERPL-MCNC: 43 MG/DL
HGB BLD-MCNC: 15.2 G/DL (ref 13.3–17.7)
LDLC SERPL CALC-MCNC: 112 MG/DL
MCH RBC QN AUTO: 29.9 PG (ref 26.5–33)
MCHC RBC AUTO-ENTMCNC: 33.3 G/DL (ref 31.5–36.5)
MCV RBC AUTO: 90 FL (ref 78–100)
NONHDLC SERPL-MCNC: 125 MG/DL
PLATELET # BLD AUTO: 175 10E3/UL (ref 150–450)
POTASSIUM BLD-SCNC: 4.2 MMOL/L (ref 3.4–5.3)
PROT SERPL-MCNC: 6.9 G/DL (ref 6.8–8.8)
PSA SERPL-MCNC: 4.32 UG/L (ref 0–4)
RBC # BLD AUTO: 5.09 10E6/UL (ref 4.4–5.9)
SODIUM SERPL-SCNC: 142 MMOL/L (ref 133–144)
TRIGL SERPL-MCNC: 66 MG/DL
TSH SERPL DL<=0.005 MIU/L-ACNC: 1.13 MU/L (ref 0.4–4)
WBC # BLD AUTO: 6.1 10E3/UL (ref 4–11)

## 2022-10-03 PROCEDURE — 80061 LIPID PANEL: CPT | Performed by: INTERNAL MEDICINE

## 2022-10-03 PROCEDURE — 36415 COLL VENOUS BLD VENIPUNCTURE: CPT | Performed by: INTERNAL MEDICINE

## 2022-10-03 PROCEDURE — 99396 PREV VISIT EST AGE 40-64: CPT | Performed by: INTERNAL MEDICINE

## 2022-10-03 PROCEDURE — G0103 PSA SCREENING: HCPCS | Performed by: INTERNAL MEDICINE

## 2022-10-03 PROCEDURE — 84443 ASSAY THYROID STIM HORMONE: CPT | Performed by: INTERNAL MEDICINE

## 2022-10-03 PROCEDURE — 80053 COMPREHEN METABOLIC PANEL: CPT | Performed by: INTERNAL MEDICINE

## 2022-10-03 PROCEDURE — 99213 OFFICE O/P EST LOW 20 MIN: CPT | Mod: 25 | Performed by: INTERNAL MEDICINE

## 2022-10-03 PROCEDURE — 85027 COMPLETE CBC AUTOMATED: CPT | Performed by: INTERNAL MEDICINE

## 2022-10-03 RX ORDER — LEVOTHYROXINE SODIUM 88 UG/1
88 TABLET ORAL DAILY
Qty: 90 TABLET | Refills: 3 | Status: SHIPPED | OUTPATIENT
Start: 2022-10-03 | End: 2023-07-03

## 2022-10-03 ASSESSMENT — PAIN SCALES - GENERAL: PAINLEVEL: MILD PAIN (3)

## 2022-10-03 ASSESSMENT — ENCOUNTER SYMPTOMS
COUGH: 0
JOINT SWELLING: 0
DYSURIA: 1
CONSTIPATION: 0
SHORTNESS OF BREATH: 0
WEAKNESS: 0
MYALGIAS: 0
ARTHRALGIAS: 1
HEMATOCHEZIA: 0
SORE THROAT: 0
CHILLS: 0
FREQUENCY: 1
PALPITATIONS: 0
DIARRHEA: 0
NAUSEA: 0
EYE PAIN: 0
DIZZINESS: 0
HEADACHES: 0
ABDOMINAL PAIN: 0
HEMATURIA: 0
PARESTHESIAS: 0
NERVOUS/ANXIOUS: 0
FEVER: 0
HEARTBURN: 0

## 2022-10-03 NOTE — PROGRESS NOTES
SUBJECTIVE:   CC: Vaughn is an 62 year old who presents for preventative health visit and follow-up regarding her hypothyroidism, PSA elevation and BPH      Patient has been advised of split billing requirements and indicates understanding: Yes  Healthy Habits:     Getting at least 3 servings of Calcium per day:  NO    Bi-annual eye exam:  NO    Dental care twice a year:  Yes    Sleep apnea or symptoms of sleep apnea:  None    Diet:  Regular (no restrictions)    Frequency of exercise:  2-3 days/week    Duration of exercise:  30-45 minutes    Taking medications regularly:  Yes    Medication side effects:  None    PHQ-2 Total Score: 0    Additional concerns today:  No              Today's PHQ-2 Score:   PHQ-2 ( 1999 Pfizer) 10/3/2022   Q1: Little interest or pleasure in doing things 0   Q2: Feeling down, depressed or hopeless 0   PHQ-2 Score 0   PHQ-2 Total Score (12-17 Years)- Positive if 3 or more points; Administer PHQ-A if positive -   Q1: Little interest or pleasure in doing things Not at all   Q2: Feeling down, depressed or hopeless Not at all   PHQ-2 Score 0       Abuse: Current or Past(Physical, Sexual or Emotional)- No  Do you feel safe in your environment? Yes        Social History     Tobacco Use     Smoking status: Never Smoker     Smokeless tobacco: Never Used   Substance Use Topics     Alcohol use: Yes     Comment: socially     If you drink alcohol do you typically have >3 drinks per day or >7 drinks per week? No    Alcohol Use 10/3/2022   Prescreen: >3 drinks/day or >7 drinks/week? No   Prescreen: >3 drinks/day or >7 drinks/week? -       Last PSA:   PSA   Date Value Ref Range Status   01/18/2021 4.14 (H) 0 - 4 ug/L Final     Comment:     Assay Method:  Chemiluminescence using Siemens Vista analyzer       Reviewed orders with patient. Reviewed health maintenance and updated orders accordingly - Yes  Labs reviewed in EPIC    Reviewed and updated as needed this visit by clinical staff   Tobacco  Allergies   "Meds                Reviewed and updated as needed this visit by Provider                       Review of Systems   Constitutional: Negative for chills and fever.   HENT: Negative for congestion, ear pain, hearing loss and sore throat.    Eyes: Negative for pain and visual disturbance.   Respiratory: Negative for cough and shortness of breath.    Cardiovascular: Negative for chest pain, palpitations and peripheral edema.   Gastrointestinal: Negative for abdominal pain, constipation, diarrhea, heartburn, hematochezia and nausea.   Genitourinary: Positive for dysuria and frequency. Negative for genital sores, hematuria, impotence, penile discharge and urgency.   Musculoskeletal: Negative for joint swelling and myalgias.   Skin: Negative for rash.   Neurological: Negative for dizziness, weakness, headaches and paresthesias.   Psychiatric/Behavioral: Negative for mood changes. The patient is not nervous/anxious.      Patient exercising by jogging once a week and golfing 18 holes once a week.  Patient walks the course  Patient came in fasting this morning.  Did not have any liquid to drink either this morning or since supper last night so slightly on the dehydrated side  Followed by urology regarding history of BPH and PSA elevation.  PSA in January 2022 had dropped to 2.9 after prior elevation in the low fours.  Has some chronic urinary hesitancy and frequency with BPH.  Improved with Flomax.  Urology note from January 2022 reviewed and seeing Dr Tucker    OBJECTIVE:   BP 92/68 (BP Location: Left arm, Patient Position: Sitting, Cuff Size: Adult Regular)   Pulse 70   Temp (!) 95.5  F (35.3  C)   Resp 18   Ht 1.842 m (6' 0.5\")   Wt 84.2 kg (185 lb 9.6 oz)   SpO2 99%   BMI 24.83 kg/m      Physical Exam  General appearance - healthy, alert, no distress  Skin - No rashes or lesions.  Head - normocephalic, atraumatic  Eyes - LILLY, EOMI, fundi exam with nondilated pupils negative.  Ears - External ears normal. Canals " clear. TM's normal.  Nose/Sinuses - Nares normal. Septum midline. Mucosa normal. No drainage or sinus tenderness.  Oropharynx - No erythema, no adenopathy, no exudates.  Neck - Supple without adenopathy or thyromegaly. approx 1cm thyroid nodule palpable mid thyroid isthmus area (old).  No bruits.  Lungs - Clear to auscultation without wheezes/rhonchi.  Heart - Regular rate and rhythm without murmurs, clicks, or gallops.  Nodes - No supraclavicular, axillary, or inguinal adenopathy palpable.  Abdomen - Abdomen soft, non-tender. BS normal. No masses or hepatosplenomegaly palpable. No bruits.  Extremities -No cyanosis, clubbing or edema.    Musculoskeletal - Spine ROM normal. Muscular strength intact.   Peripheral pulses - radial=4/4, femoral=4/4, posterior tibial=4/4, dorsalis pedis=4/4,  Neuro - Gait normal. Reflexes normal and symmetric. Sensation grossly WNL.  Genital - Normal-appearing male external genitalia. No scrotal masses or inguinal hernia palpable.   Rectal - Guaic negative stool. Normal tone. Prostate 2 plus in size to palpation. No rectal masses or prostate nodularity palpable      ASSESSMENT/PLAN:   1. Encounter for routine adult health examination without abnormal findings  Candidate for COVID booster and flu vaccination.  Patient request to have done at pharmacy.  Recent colonoscopy noted and will recheck in 10 years.  Counseled regarding continued exercise.  Blood pressure low normal today here.  Likely related to mild dehydration. Will give Prevnar vaccine at age 65 given absence of other risk factors to give early  - Comprehensive metabolic panel  - Lipid panel reflex to direct LDL Fasting  - CBC with platelets    2. Hypothyroidism, unspecified type  Previously controlled.  Continue levothyroxine.  Labs today as ordered  - levothyroxine (SYNTHROID/LEVOTHROID) 88 MCG tablet; Take 1 tablet (88 mcg) by mouth daily  Dispense: 90 tablet; Refill: 3  - TSH with free T4 reflex    3. Benign non-nodular  "prostatic hyperplasia with lower urinary tract symptoms  On tamsulosin.  Continue management per urology    4. Screening for diabetes mellitus  Candidate for screening  - Comprehensive metabolic panel    5. CARDIOVASCULAR SCREENING; LDL GOAL LESS THAN 100  Candidate for screening  - Lipid panel reflex to direct LDL Fasting    6. Screening for prostate cancer  Candidate for screening.  Previous history of mild PSA elevation in the low 4's consistent with prostate size.  Prostate smooth.  Will follow-up with urology annually in addition  - Prostate spec antigen screen      Patient has been advised of split billing requirements and indicates understanding: Yes    COUNSELING:   Reviewed preventive health counseling, as reflected in patient instructions    Estimated body mass index is 24.83 kg/m  as calculated from the following:    Height as of this encounter: 1.842 m (6' 0.5\").    Weight as of this encounter: 84.2 kg (185 lb 9.6 oz).         He reports that he has never smoked. He has never used smokeless tobacco.      Counseling Resources:  ATP IV Guidelines  Pooled Cohorts Equation Calculator  FRAX Risk Assessment  ICSI Preventive Guidelines  Dietary Guidelines for Americans, 2010  [employer] MyPlate  ASA Prophylaxis  Lung CA Screening    PLAN:  Continue current medications  Prescriptions refilled.    Labs today as ordered  Schedule an eye exam appointment. Please ask the eye clinic to fax us a report of your eye exam to [phone], attention Dr Mitchell  I would recommend you receive an influenza (flu) vaccine  this Fall (mid/late October)   I would recommend a covid booster vaccination. You may have it done at any pharmacy. If you wish to have it done at a [place] pharmacy, then go to [email or web address] to schedule a vaccination appointment   Follow-up with Urology in January 2023  Pt was informed regarding extra E&M billing for management of new or established medical issues not related to today's " wellness/screening visit      Zhao Mitchell MD  [facility]

## 2022-10-03 NOTE — PATIENT INSTRUCTIONS
Continue current medications  Prescriptions refilled.    Labs today as ordered  Schedule an eye exam appointment. Please ask the eye clinic to fax us a report of your eye exam to [phone], attention Dr Mitchell  I would recommend you receive an influenza (flu) vaccine  this Fall (mid/late October)   I would recommend a covid booster vaccination. You may have it done at any pharmacy. If you wish to have it done at a [place] pharmacy, then go to [email or web address] to schedule a vaccination appointment   Follow-up with Urology in January 2023  Pt was informed regarding extra E&M billing for management of new or established medical issues not related to today's wellness/screening visit

## 2022-10-23 ENCOUNTER — HEALTH MAINTENANCE LETTER (OUTPATIENT)
Age: 62
End: 2022-10-23

## 2023-03-01 ENCOUNTER — TRANSFERRED RECORDS (OUTPATIENT)
Dept: HEALTH INFORMATION MANAGEMENT | Facility: CLINIC | Age: 63
End: 2023-03-01

## 2023-03-27 ENCOUNTER — TRANSFERRED RECORDS (OUTPATIENT)
Dept: HEALTH INFORMATION MANAGEMENT | Facility: CLINIC | Age: 63
End: 2023-03-27

## 2023-05-17 ENCOUNTER — TRANSFERRED RECORDS (OUTPATIENT)
Dept: HEALTH INFORMATION MANAGEMENT | Facility: CLINIC | Age: 63
End: 2023-05-17
Payer: COMMERCIAL

## 2023-07-02 DIAGNOSIS — E03.9 HYPOTHYROIDISM, UNSPECIFIED TYPE: ICD-10-CM

## 2023-07-03 RX ORDER — LEVOTHYROXINE SODIUM 88 UG/1
TABLET ORAL
Qty: 90 TABLET | Refills: 0 | Status: SHIPPED | OUTPATIENT
Start: 2023-07-03 | End: 2023-09-17

## 2023-09-05 ENCOUNTER — PATIENT OUTREACH (OUTPATIENT)
Dept: CARE COORDINATION | Facility: CLINIC | Age: 63
End: 2023-09-05
Payer: COMMERCIAL

## 2023-09-14 ENCOUNTER — TRANSFERRED RECORDS (OUTPATIENT)
Dept: HEALTH INFORMATION MANAGEMENT | Facility: CLINIC | Age: 63
End: 2023-09-14

## 2023-09-15 DIAGNOSIS — Z12.5 SCREENING FOR PROSTATE CANCER: Primary | ICD-10-CM

## 2023-09-15 DIAGNOSIS — E03.9 HYPOTHYROIDISM, UNSPECIFIED TYPE: ICD-10-CM

## 2023-09-15 DIAGNOSIS — Z00.00 LABORATORY EXAMINATION ORDERED AS PART OF A ROUTINE GENERAL MEDICAL EXAMINATION: ICD-10-CM

## 2023-09-17 RX ORDER — LEVOTHYROXINE SODIUM 88 UG/1
TABLET ORAL
Qty: 90 TABLET | Refills: 0 | Status: SHIPPED | OUTPATIENT
Start: 2023-09-17 | End: 2023-10-23

## 2023-09-17 NOTE — TELEPHONE ENCOUNTER
Call pt. Due for follow-up physical/management of thyroid with appt with me sometime in October. OKto use future open same day/next day/V[facility] appt slot that would work for him for the appt in clinic with me. Assist with scheduling MD appt and also schedule fasting lab appt in the week or so before the appt with me so can review lab results when pt seen in clinic. Med refilled for 3 mos

## 2023-09-18 NOTE — TELEPHONE ENCOUNTER
Patient had a procedure last week and is in the process of healing.  He will call back next week to schedules appointments.

## 2023-09-19 ENCOUNTER — PATIENT OUTREACH (OUTPATIENT)
Dept: CARE COORDINATION | Facility: CLINIC | Age: 63
End: 2023-09-19
Payer: COMMERCIAL

## 2023-10-11 ENCOUNTER — TELEPHONE (OUTPATIENT)
Dept: INTERNAL MEDICINE | Facility: CLINIC | Age: 63
End: 2023-10-11
Payer: COMMERCIAL

## 2023-10-11 NOTE — TELEPHONE ENCOUNTER
Are we able to work Dirk into your schedule before the end of the year or should we schedule with your next available in person?

## 2023-10-11 NOTE — TELEPHONE ENCOUNTER
Reason for Call:  Appointment Request    Patient requesting this type of appt:  Preventive     Requested provider: Zhao Mitchell    Reason patient unable to be scheduled: Not within requested timeframe    When does patient want to be seen/preferred time:  before end of year     Comments: no appts until next year    Could we send this information to you in [employer] or would you prefer to receive a phone call?:   Patient would prefer a phone call   Okay to leave a detailed message?: Yes at Cell number on file:    Telephone Information:   Mobile [phone]       Call taken on 10/11/2023 at 3:00 PM by Brenna Kovacs

## 2023-10-20 ENCOUNTER — LAB (OUTPATIENT)
Dept: LAB | Facility: CLINIC | Age: 63
End: 2023-10-20
Payer: COMMERCIAL

## 2023-10-20 DIAGNOSIS — Z00.00 LABORATORY EXAMINATION ORDERED AS PART OF A ROUTINE GENERAL MEDICAL EXAMINATION: ICD-10-CM

## 2023-10-20 DIAGNOSIS — E03.9 HYPOTHYROIDISM, UNSPECIFIED TYPE: ICD-10-CM

## 2023-10-20 DIAGNOSIS — Z12.5 SCREENING FOR PROSTATE CANCER: ICD-10-CM

## 2023-10-20 LAB
ALBUMIN SERPL BCG-MCNC: 4.3 G/DL (ref 3.5–5.2)
ALP SERPL-CCNC: 59 U/L (ref 40–129)
ALT SERPL W P-5'-P-CCNC: 17 U/L (ref 0–70)
ANION GAP SERPL CALCULATED.3IONS-SCNC: 7 MMOL/L (ref 7–15)
AST SERPL W P-5'-P-CCNC: 31 U/L (ref 0–45)
BILIRUB SERPL-MCNC: 0.6 MG/DL
BUN SERPL-MCNC: 11 MG/DL (ref 8–23)
CALCIUM SERPL-MCNC: 9.5 MG/DL (ref 8.8–10.2)
CHLORIDE SERPL-SCNC: 104 MMOL/L (ref 98–107)
CHOLEST SERPL-MCNC: 166 MG/DL
CREAT SERPL-MCNC: 1.07 MG/DL (ref 0.67–1.17)
DEPRECATED HCO3 PLAS-SCNC: 29 MMOL/L (ref 22–29)
EGFRCR SERPLBLD CKD-EPI 2021: 78 ML/MIN/1.73M2
ERYTHROCYTE [DISTWIDTH] IN BLOOD BY AUTOMATED COUNT: 12.6 % (ref 10–15)
GLUCOSE SERPL-MCNC: 94 MG/DL (ref 70–99)
HCT VFR BLD AUTO: 46.8 % (ref 40–53)
HDLC SERPL-MCNC: 43 MG/DL
HGB BLD-MCNC: 15.3 G/DL (ref 13.3–17.7)
LDLC SERPL CALC-MCNC: 110 MG/DL
MCH RBC QN AUTO: 29.7 PG (ref 26.5–33)
MCHC RBC AUTO-ENTMCNC: 32.7 G/DL (ref 31.5–36.5)
MCV RBC AUTO: 91 FL (ref 78–100)
NONHDLC SERPL-MCNC: 123 MG/DL
PLATELET # BLD AUTO: 186 10E3/UL (ref 150–450)
POTASSIUM SERPL-SCNC: 4.8 MMOL/L (ref 3.4–5.3)
PROT SERPL-MCNC: 7.1 G/DL (ref 6.4–8.3)
RBC # BLD AUTO: 5.16 10E6/UL (ref 4.4–5.9)
SODIUM SERPL-SCNC: 140 MMOL/L (ref 135–145)
TRIGL SERPL-MCNC: 64 MG/DL
TSH SERPL DL<=0.005 MIU/L-ACNC: 1.79 UIU/ML (ref 0.3–4.2)
WBC # BLD AUTO: 5.6 10E3/UL (ref 4–11)

## 2023-10-20 PROCEDURE — 84443 ASSAY THYROID STIM HORMONE: CPT

## 2023-10-20 PROCEDURE — G0103 PSA SCREENING: HCPCS

## 2023-10-20 PROCEDURE — 36415 COLL VENOUS BLD VENIPUNCTURE: CPT

## 2023-10-20 PROCEDURE — 80061 LIPID PANEL: CPT

## 2023-10-20 PROCEDURE — 85027 COMPLETE CBC AUTOMATED: CPT

## 2023-10-20 PROCEDURE — 80053 COMPREHEN METABOLIC PANEL: CPT

## 2023-10-22 LAB — PSA SERPL DL<=0.01 NG/ML-MCNC: 4.13 NG/ML (ref 0–4.5)

## 2023-10-23 ENCOUNTER — ANCILLARY PROCEDURE (OUTPATIENT)
Dept: GENERAL RADIOLOGY | Facility: CLINIC | Age: 63
End: 2023-10-23
Attending: INTERNAL MEDICINE
Payer: COMMERCIAL

## 2023-10-23 ENCOUNTER — OFFICE VISIT (OUTPATIENT)
Dept: INTERNAL MEDICINE | Facility: CLINIC | Age: 63
End: 2023-10-23
Payer: COMMERCIAL

## 2023-10-23 VITALS
HEART RATE: 61 BPM | DIASTOLIC BLOOD PRESSURE: 77 MMHG | WEIGHT: 189.5 LBS | OXYGEN SATURATION: 98 % | SYSTOLIC BLOOD PRESSURE: 122 MMHG | TEMPERATURE: 97.6 F | BODY MASS INDEX: 25.12 KG/M2 | HEIGHT: 73 IN

## 2023-10-23 DIAGNOSIS — R06.83 SNORING: ICD-10-CM

## 2023-10-23 DIAGNOSIS — M79.671 RIGHT FOOT PAIN: ICD-10-CM

## 2023-10-23 DIAGNOSIS — Z00.01 ENCOUNTER FOR ROUTINE ADULT MEDICAL EXAM WITH ABNORMAL FINDINGS: Primary | ICD-10-CM

## 2023-10-23 DIAGNOSIS — E78.5 HYPERLIPIDEMIA LDL GOAL <100: ICD-10-CM

## 2023-10-23 DIAGNOSIS — Z12.5 SCREENING FOR PROSTATE CANCER: ICD-10-CM

## 2023-10-23 DIAGNOSIS — E03.9 HYPOTHYROIDISM, UNSPECIFIED TYPE: ICD-10-CM

## 2023-10-23 PROCEDURE — 99396 PREV VISIT EST AGE 40-64: CPT | Performed by: INTERNAL MEDICINE

## 2023-10-23 PROCEDURE — 99214 OFFICE O/P EST MOD 30 MIN: CPT | Mod: 25 | Performed by: INTERNAL MEDICINE

## 2023-10-23 PROCEDURE — 73630 X-RAY EXAM OF FOOT: CPT | Mod: TC | Performed by: RADIOLOGY

## 2023-10-23 RX ORDER — LEVOTHYROXINE SODIUM 88 UG/1
88 TABLET ORAL DAILY
Qty: 90 TABLET | Refills: 3 | Status: SHIPPED | OUTPATIENT
Start: 2023-10-23

## 2023-10-23 ASSESSMENT — ENCOUNTER SYMPTOMS
HEARTBURN: 0
MYALGIAS: 1
DYSURIA: 0
NAUSEA: 0
ARTHRALGIAS: 0
HEMATURIA: 0
ABDOMINAL PAIN: 0
CONSTIPATION: 0
CHILLS: 0
SHORTNESS OF BREATH: 0
FEVER: 0
PARESTHESIAS: 0
NERVOUS/ANXIOUS: 0
FREQUENCY: 1
SORE THROAT: 0
DIARRHEA: 0
HEMATOCHEZIA: 0
PALPITATIONS: 0
EYE PAIN: 0
COUGH: 0
DIZZINESS: 0
JOINT SWELLING: 0
WEAKNESS: 0

## 2023-10-23 NOTE — PROGRESS NOTES
SUBJECTIVE:   CC: Vaughn is an 63 year old who presents for preventative health visit, follow-up hypothyroidism and evaluation of other medical issues as below       Healthy Habits:     Getting at least 3 servings of Calcium per day:  NO    Bi-annual eye exam:  Yes    Dental care twice a year:  Yes    Sleep apnea or symptoms of sleep apnea:  Excessive snoring    Diet:  Regular (no restrictions)    Frequency of exercise:  2-3 days/week    Duration of exercise:  30-45 minutes    Taking medications regularly:  Yes    Medication side effects:  None    Additional concerns today:  No    Additional concerns: interested in sleep study, side of the right foot when walking distance started in the last 6 weeks    Today's PHQ-2 Score:       10/23/2023    11:16 AM   PHQ-2 ( 1999 [employer])   Q1: Little interest or pleasure in doing things 0   Q2: Feeling down, depressed or hopeless 0   PHQ-2 Score 0   Q1: Little interest or pleasure in doing things Not at all   Q2: Feeling down, depressed or hopeless Not at all   PHQ-2 Score 0               Social History     Tobacco Use    Smoking status: Never    Smokeless tobacco: Never   Substance Use Topics    Alcohol use: Yes     Comment: socially            10/23/2023    11:16 AM   Alcohol Use   Prescreen: >3 drinks/day or >7 drinks/week? No       Last PSA:   PSA   Date Value Ref Range Status   01/18/2021 4.14 (H) 0 - 4 ug/L Final     Comment:     Assay Method:  Chemiluminescence using Siemens Vista analyzer     Prostate Specific Antigen Screen   Date Value Ref Range Status   10/20/2023 4.13 0.00 - 4.50 ng/mL Final   10/03/2022 4.32 (H) 0.00 - 4.00 ug/L Final       Reviewed orders with patient. Reviewed health maintenance and updated orders accordingly - Yes  Labs reviewed in EPIC    Component      Latest Ref Rng 10/3/2022  10:42 AM 10/20/2023  10:32 AM   Sodium      135 - 145 mmol/L  140    Potassium      3.4 - 5.3 mmol/L  4.8    Carbon Dioxide (CO2)      22 - 29 mmol/L  29    Anion Gap       7 - 15 mmol/L  7    Urea Nitrogen      8.0 - 23.0 mg/dL  11.0    Creatinine      0.67 - 1.17 mg/dL  1.07    GFR Estimate      >60 mL/min/1.73m2  78    Calcium      8.8 - 10.2 mg/dL  9.5    Chloride      98 - 107 mmol/L  104    Glucose      70 - 99 mg/dL  94    Alkaline Phosphatase      40 - 129 U/L  59    AST      0 - 45 U/L  31    ALT      0 - 70 U/L  17    Protein Total      6.4 - 8.3 g/dL  7.1    Albumin      3.5 - 5.2 g/dL  4.3    Bilirubin Total      <=1.2 mg/dL  0.6    WBC      4.0 - 11.0 10e3/uL  5.6    RBC Count      4.40 - 5.90 10e6/uL  5.16    Hemoglobin      13.3 - 17.7 g/dL  15.3    Hematocrit      40.0 - 53.0 %  46.8    MCV      78 - 100 fL  91    MCH      26.5 - 33.0 pg  29.7    MCHC      31.5 - 36.5 g/dL  32.7    RDW      10.0 - 15.0 %  12.6    Platelet Count      150 - 450 10e3/uL  186    Cholesterol      <200 mg/dL  166    Triglycerides      <150 mg/dL  64    HDL Cholesterol      >=40 mg/dL  43    LDL Cholesterol Calculated      <=100 mg/dL  110 (H)    Non HDL Cholesterol      <130 mg/dL  123    PSA      0.00 - 4.00 ug/L 4.32 (H)     TSH      0.30 - 4.20 uIU/mL  1.79    PSA Tumor Marker      0.00 - 4.50 ng/mL  4.13       Legend:  (H) High    No identified additional risks  The 10-year ASCVD risk score (Baylee DK, et al., 2019) is: 9.6%    Values used to calculate the score:      Age: 63 years      Sex: Male      Is Non- : No      Diabetic: No      Tobacco smoker: No      Systolic Blood Pressure: 122 mmHg      Is BP treated: No      HDL Cholesterol: 43 mg/dL      Total Cholesterol: 166 mg/dL    Reviewed and updated as needed this visit by clinical staff                  Reviewed and updated as needed this visit by Provider                     Review of Systems   Constitutional:  Negative for chills and fever.   HENT:  Negative for congestion, ear pain, hearing loss and sore throat.    Eyes:  Negative for pain and visual disturbance.   Respiratory:  Negative for cough and  "shortness of breath.    Cardiovascular:  Negative for chest pain, palpitations and peripheral edema.   Gastrointestinal:  Negative for abdominal pain, constipation, diarrhea, heartburn, hematochezia and nausea.   Genitourinary:  Positive for frequency. Negative for dysuria, genital sores, hematuria, impotence and penile discharge.   Musculoskeletal:  Positive for arthralgias. Negative for joint swelling.   Skin:  Negative for rash.   Neurological:  Negative for dizziness, weakness and paresthesias.   Psychiatric/Behavioral:  Negative for mood changes. The patient is not nervous/anxious.      9/18/23 REZUM procedure. Since then, PSA a little lower and urinating  \"like I was 40\". No dysuria or hematuria  Active with biking for exercise   Past 6 weeks, pain right lateral mid foot. Golfing also. No other trauma. No acute  warmth/erythema in foot area. No rash    OBJECTIVE:   /77   Pulse 61   Temp 97.6  F (36.4  C) (Temporal)   Ht 1.854 m (6' 1\")   Wt 86 kg (189 lb 8 oz)   SpO2 98%   BMI 25.00 kg/m      Physical Exam  General appearance - healthy, alert, no distress  Skin - No rashes or lesions.  Head - normocephalic, atraumatic  Eyes - LILLY, EOMI, fundi exam with nondilated pupils negative.  Ears - External ears normal. Canals clear. TM's normal.  Nose/Sinuses - Nares normal. Septum midline. Mucosa normal. No drainage or sinus tenderness.  Oropharynx - No erythema, no adenopathy, no exudates.  Neck - Supple without adenopathy or thyromegaly. No bruits.  Lungs - Clear to auscultation without wheezes/rhonchi.  Heart - Regular rate and rhythm without murmurs, clicks, or gallops.  Nodes - No supraclavicular, axillary, or inguinal adenopathy palpable.  Abdomen - Abdomen soft, non-tender. BS normal. No masses or hepatosplenomegaly palpable. No bruits.  Extremities -No cyanosis, clubbing or edema.    Musculoskeletal - Spine ROM normal. Muscular strength intact.  Mild tenderness to palpation right lateral mid foot. " No mass palpable. NO erythema or warmth  Peripheral pulses - radial=4/4, femoral=4/4, posterior tibial=4/4, dorsalis pedis=4/4,  Neuro - Gait normal. Reflexes normal and symmetric. Sensation grossly WNL.  Genital - Normal-appearing male external genitalia. No scrotal masses or inguinal hernia palpable.   Rectal - deferred to Urology      Narrative & Impression   FOOT THREE VIEWS RIGHT  10/23/2023 12:23 PM      HISTORY: right lateral mid foot pain. BB making area of pain; Right  foot pain  COMPARISON: None.                                                                      IMPRESSION: No acute fracture is identified. There is normal joint  alignment. No significant degenerative changes. Bone spur at the  dorsal talar head.     BELA BARR MD           ASSESSMENT/PLAN:   1. Encounter for routine adult medical exam with abnormal findings   Repeat screening labs 1 year. See plan discussion below for HCM. Will vaccines through pharmcay. Declines receiving today  - Comprehensive metabolic panel; Future  - Lipid panel reflex to direct LDL Fasting; Future  - CBC with platelets; Future    2. Right foot pain   NO fx on Xray. Will treat with orthotics for likely higher arch strain. If not improving,to contact clinic and will refer to podiatry  - XR Foot Right G/E 3 Views; Future  - Orthotics and Prosthetics DME Orthotic; Foot Orthotics; Bilateral    3. Hyperlipidemia LDL goal <100  10 yr CAD risk 9.6%. trigs low. Active and no chest pain sx. Will get CT coronary calcium score to see if statin truly indicated or not  - CT Coronary Calcium Scan; Future    4. Hypothyroidism, unspecified type  Controlled. Continue med. Repeat lab 1 year  - levothyroxine (SYNTHROID/LEVOTHROID) 88 MCG tablet; Take 1 tablet (88 mcg) by mouth daily  Dispense: 90 tablet; Refill: 3  - TSH with free T4 reflex; Future    5. Snoring   Energy OK. snoring per wife. No nasal issues. Will get sleep study  - Adult Sleep Eval & Management   Referral; Future    6. Screening for prostate cancer   Recent REZUM procedure will lower PSA. Get new baseline level in 6 mos  - Prostate Specific Antigen Screen; Future      Patient has been advised of split billing requirements and indicates understanding: Yes      COUNSELING:   Reviewed preventive health counseling, as reflected in patient instructions        He reports that he has never smoked. He has never used smokeless tobacco.     PLAN:  Continue current medications  Prescriptions refilled.    Call  [phone] or use [facility] to schedule a future lab appointment  non-fasting in 6 months for new PSA  baseline following REZUM procedure and fasting labs for cholesterol, thyroid, etc in 1 year.   For fasting labs, please refrain from eating for 8 hours or more.   Drink 2 glasses of water before your lab appointment. It is fine to take your  oral medications on the morning of the lab test as usual  CT coronary calcium scan to assess degree calcium build-up in coronary arteries of the heart to guide whether statin cholesterol medication appropriate or not for cholesterol management and heart disease risk reduction  [facility] will call you to schedule. If you have not heard from their schedulers within 2 business days, then call [phone] (radiology scheduling)   I would recommend a covid booster  and influenza/flu vaccination. You may have it done at any pharmacy. If you wish to have it done at a [facility] pharmacy, then go to [email or web address] to schedule a vaccination appointment   Orthotics for your feet to support arches and lessen strain on right lateral foot pain area.  I have placed a prescription in your chart for the orthotics for your feet. Schedule an appointment at [facility] dept. Their address is [address] [place] [address]. Thy are open 8:00am-4:30pm Monday-Friday. Call [phone] to schedule an appointment  May also apply over the  counter Voltaren gel to the area of foot pain as needed  I also placed a referral to the [facility] clinic for future consultation regarding your snoring and getting a sleep study done.  Central scheduling will call you to coordinate your appointment.  If you don't hear from a representative within 2 business days, please call [phone].   Pt was informed regarding extra E&M billing for management of new or established medical issues not related to today's wellness/screening visit      Zhao Mitchell MD  [facility]

## 2023-10-24 ASSESSMENT — ENCOUNTER SYMPTOMS: ARTHRALGIAS: 1

## 2023-10-30 ENCOUNTER — TELEPHONE (OUTPATIENT)
Dept: INTERNAL MEDICINE | Facility: CLINIC | Age: 63
End: 2023-10-30

## 2023-11-22 ENCOUNTER — HOSPITAL ENCOUNTER (OUTPATIENT)
Dept: CARDIOLOGY | Facility: CLINIC | Age: 63
Discharge: HOME | End: 2023-11-22
Attending: INTERNAL MEDICINE | Admitting: INTERNAL MEDICINE
Payer: COMMERCIAL

## 2023-11-22 DIAGNOSIS — E78.5 HYPERLIPIDEMIA LDL GOAL <70: ICD-10-CM

## 2023-11-22 DIAGNOSIS — E78.5 HYPERLIPIDEMIA LDL GOAL <100: ICD-10-CM

## 2023-11-22 DIAGNOSIS — R93.1 AGATSTON CORONARY ARTERY CALCIUM SCORE BETWEEN 100 AND 199: Primary | ICD-10-CM

## 2023-11-22 PROCEDURE — 75571 CT HRT W/O DYE W/CA TEST: CPT | Mod: 26 | Performed by: INTERNAL MEDICINE

## 2023-11-22 PROCEDURE — 75571 CT HRT W/O DYE W/CA TEST: CPT

## 2023-11-22 RX ORDER — ATORVASTATIN CALCIUM 40 MG/1
40 TABLET, FILM COATED ORAL DAILY
Qty: 90 TABLET | Refills: 3 | Status: SHIPPED | OUTPATIENT
Start: 2023-11-22 | End: 2024-01-02 | Stop reason: SINTOL

## 2023-11-22 RX ORDER — ASPIRIN 81 MG/1
81 TABLET ORAL DAILY
COMMUNITY
Start: 2023-11-22

## 2023-12-28 ENCOUNTER — MYC MEDICAL ADVICE (OUTPATIENT)
Dept: INTERNAL MEDICINE | Facility: CLINIC | Age: 63
End: 2023-12-28
Payer: COMMERCIAL

## 2024-01-29 NOTE — PROGRESS NOTES
Outpatient Sleep Medicine Consultation:      Name: Vaughn Henderson MRN# [id number]   Age: 63 year old YOB: 1960     Date of Consultation: January 29, 2024  Consultation is requested by: Zhao Mitchell MD  [address] [place] [address] Zhao Mitchell  Primary care provider: Zhao Mitchell       Reason for Sleep Consult:     Vaughn Henderson is sent by Zhao Mitchell for a sleep consultation regarding snoring.    Patient s Reason for visit  Vaughn Henderson main reason for visit: snoring a lot, wake up sweating every other night and restless leg movements  Patient states problem(s) started: 2 years ago  Vaughn Henderson's goals for this visit: to determine if I am a candidate for a cpap or other solution to symptoms           Assessment and Plan:     Summary Sleep Diagnoses and Recommendations:  (R06.83) Snoring  (primary encounter diagnosis), (Z82.0) Family history of sleep apnea, (R61) Night sweat  Comment: Vaughn presents with concerns of sleep apnea. He has loud snoring, particularly when on his back. He has tried to wedge something behind him to avoid supine sleep, but still ends up on his back. He is not observed to have pauses in breathing, gasps or snorts. His risk for apnea is still mild to moderate. STOP BANG TOTAL: 4 snoring, age >50 (63), neck >40 cm (41 cm), male gender. He has a family history of PRASHANT in his father, who was not overweight. He has a large tongue and low draping soft palate, causing a Mallampati score of 4. He denies daytime sleepiness, but has been going to bed earlier in the last 6 months due to feeling tired and dozing before bedtime. His ESS is 3/24. Negative risk factors also include BMI 24 and no HTN.  He has a splint for bruxing.  Plan: HST- he said he would be willing to pay out of pocket for the HST if not covered, and he is most interested in CPAP as a treatment if he has PRASHANT        (G47.61) Periodic limb movement disorder  Comment: He has some leg aches (calves) in the evening  and is observed to kick/twitch in his sleep. No history of anemia, but no ferritin on file.  Plan: Will reassess after his HST.     (F51.01) Primary insomnia  Comment: It has been taking him 30-60 minutes to fall asleep. He has some racing mind at night, thinking about what he has to do the next day. He used to be tired enough to fall right to sleep. In the last 6 months or so, he has been more tired earlier, so has been going to bed at 10 PM instead of 10:30 PM, and sometimes dozing watching TV before going to bed.   Plan: Try to keep bedtime at 10:30 PM and avoid dozing before bedtime. We will reassess after his sleep study.       Comorbid Diagnoses:  Hypothyroidism, BPH    Summary Counseling:    Sleep Testing Reviewed  Obstructive Sleep Apnea Reviewed  Complications of Untreated Sleep Apnea Reviewed      Patient will follow up 3 months after sleep study. We will review the study results over [facility] and initiate treatment before the follow up.  Bennett Goltz, PA-C      Total time spent reviewing medical records, history and physical examination, review of previous testing and interpretation as well as documentation on this date:42 min    CC: Zhao Mitchell          History of Present Illness:     Vaughn Henderson presents with concerns of waking up sweating in the last year and a half. His wife has been complaining about snoring for the last couple of years. He has been trying to avoid sleeping on his back but would roll to his back in his sleep. His snoring is better but is not sure if the sweating was much better. The sweating occurs primarily where his skin is occluded (on his back when sleeping on his back, or under his arm). His daytime energy is good. He wakes once per night for the restroom and then another time for uncertain reasons.     Past Sleep Evaluations: none    SLEEP-WAKE SCHEDULE:     Work/School Days: Patient goes to school/work: Yes   Usually gets into bed at   10 PM  Takes patient about  30_60minutes to fall asleep. This is an issue in the last 6 months. He used to fall asleep in 5 minutes. He does think about that day or what he has to do the next day.  He may be going to bed slightly earlier than in the past. He used to go to bed at about 10:30 PM. He feels a little more tired earlier than in the past. He has been dozing watching TV before going to bed.  Has trouble falling asleep 4 nights per week  Wakes up in the middle of the night 1 or 2 times.  Wakes up due to External stimuli (bed partner, pets, noise, etc);Use the bathroom  He has trouble falling back asleep   times a week.   It usually takes   to get back to sleep  Patient is usually up at 530  Uses alarm: Yes    Weekends/Non-work Days/All Other Days:  Usually gets into bed at 10   Takes patient about 30-60 minutes to fall asleep. He still has a racing mind/worry. He does work 6 days per week.  Patient is usually up at 6  Uses alarm: Yes    Sleep Need  Patient gets  8 sleep on average   Patient thinks he needs about 8 sleep    sergio Henderson prefers to sleep in this position(s): Back;Side   Patient states they do the following activities in bed:   He might read a little of the [employer] or look at [employer] briefly.     Naps  Patient takes a purposeful nap   times a week and naps are usually dont take naps in duration  He feels better after a nap: Yes  He dozes off unintentionally never days per week. He will doze watching TV before bed. He won't doze reading or working.   Patient has had a driving accident or near-miss due to sleepiness/drowsiness: No      SLEEP DISRUPTIONS:    Breathing/Snoring  Patient snores:Yes worse when supine.   Other people complain about his snoring: Yes wife will kick him  Patient has been told he stops breathing in his sleep:No gasps, snorts or pauses  He has issues with the following: Morning mouth dryness;Getting up to urinate more than once. No morning headaches. No nocturnal heartburn or reflux. No  nasal congestion at night.    Movement:  Patient gets pain, discomfort, with an urge to move:  No restless legs symptoms. His legs ache in the evening when he starts to get tired. He exercises 3 days per week and the aching is independent of that. He has not tried stretching before bed or magnesium. No history of anemia, but no ferritin on file.   It happens when he is resting:  No  It happens more at night:  No  Patient has been told he kicks his legs at night:  Yes- His wife observes him to kick or twitch his feet randomly.     Behaviors in Sleep:  Vaughn Henderson has experienced the following behaviors while sleeping: Teeth grinding-wears a bite guard.  Pt denies sleep talking, sleep walking, and dream enactment behavior. Pt denies sleep paralysis, hypnagogue and cataplexy.       Is there anything else you would like your sleep provider to know:        CAFFEINE AND OTHER SUBSTANCES:    Patient consumes caffeinated beverages per day:  2 coffee, occ diet Coke  Last caffeine use is usually: 10am  List of any prescribed or over the counter stimulants that patient takes: none  List of any prescribed or over the counter sleep medication patient takes: none  List of previous sleep medications that patient has tried: none  Patient drinks alcohol to help them sleep: No  Patient drinks alcohol near bedtime: No  May have 4 alcoholic beverages per week.    Family History:  Patient has a family member been diagnosed with a sleep disorder: Yes  father - PRASHANT- not overweight.    Social History: He is  for [employer].  He lives with his wife.        SCALES:    EPWORTH SLEEPINESS SCALE         1/30/2024     8:48 AM    Surprise Sleepiness Scale ( FELIPA Massey  [id number]<br>ESS - [place]/English - Final version - 21 Nov 07 - [facility] [place].)   Sitting and reading Would never doze   Watching TV Moderate chance of dozing   Sitting, inactive in a public place (e.g. a theatre or a meeting) Would never doze   As a passenger  in a car for an hour without a break Slight chance of dozing   Lying down to rest in the afternoon when circumstances permit Would never doze   Sitting and talking to someone Would never doze   Sitting quietly after a lunch without alcohol Would never doze   In a car, while stopped for a few minutes in traffic Would never doze   [place] Score (MC) 3   [place] Score (Sleep) 3         INSOMNIA SEVERITY INDEX (HÉCTOR)          1/30/2024     8:34 AM   Insomnia Severity Index (HÉCTOR)   Difficulty falling asleep 2   Difficulty staying asleep 1   Problems waking up too early 0   How SATISFIED/DISSATISFIED are you with your CURRENT sleep pattern? 2   How NOTICEABLE to others do you think your sleep problem is in terms of impairing the quality of your life? 4   How WORRIED/DISTRESSED are you about your current sleep problem? 3   To what extent do you consider your sleep problem to INTERFERE with your daily functioning (e.g. daytime fatigue, mood, ability to function at work/daily chores, concentration, memory, mood, etc.) CURRENTLY? 0   HÉCTOR Total Score 12       Guidelines for Scoring/Interpretation:  Total score categories:  0-7 = No clinically significant insomnia   8-14 = Subthreshold insomnia   15-21 = Clinical insomnia (moderate severity)  22-28 = Clinical insomnia (severe)  Used via courtesy of www.[place].va.gov with permission from Len Clement PhD., [facility]      STOP BANG         1/30/2024     8:48 AM   STOP BANG Questionnaire (  2008, the American Society of Anesthesiologists, Inc. [employer]   1. Snoring - Do you snore loudly (louder than talking or loud enough to be heard through closed doors)? No   2. Tired - Do you often feel tired, fatigued, or sleepy during daytime? No   3. Observed - Has anyone observed you stop breathing during your sleep? No   4. Blood pressure - Do you have or are you being treated for high blood pressure? No   5. BMI - BMI more than 35 kg/m2? No   6. Age  "- Age over 50 yr old? Yes   7. Neck circumference - Neck circumference greater than 40 cm? No   8. Gender - Gender male? Yes   STOP BANG Score (MC): 1 (Low risk of PRASHANT)         GAD7         No data to display                  CAGE-AID         No data to display                CAGE-AID reprinted with permission from the [place] Medical Journal, JOCELYNN Poole. and RITESH Magana, \"Conjoint screening questionnaires for alcohol and drug abuse\" [place] Medical Journal 94: 135-140, 1995.      PATIENT HEALTH QUESTIONNAIRE-9 (PHQ - 9)         No data to display                Developed by Adal Renner, Tanvi Cook, Heber Velarde and colleagues, with an educational rachel from [employer] No permission required to reproduce, translate, display or distribute.        Allergies:    Allergies   Allergen Reactions    Atorvastatin      Myalgia, urine frequency    Bee Venom     Sulfamethoxazole W-Trimethoprim Itching and Rash     rash       Medications:    Current Outpatient Medications   Medication Sig Dispense Refill    aspirin 81 MG EC tablet Take 1 tablet (81 mg) by mouth daily      levothyroxine (SYNTHROID/LEVOTHROID) 88 MCG tablet Take 1 tablet (88 mcg) by mouth daily 90 tablet 3       Problem List:  Patient Active Problem List    Diagnosis Date Noted    Agatston coronary artery calcium score between 100 and 199 11/22/2023     Priority: Medium    Cervicalgia 01/18/2021     Priority: Medium    Thyroid nodule 11/18/2019     Priority: Medium     1 cm in isthmus      Hypothyroidism      Priority: Medium    Benign non-nodular prostatic hyperplasia with lower urinary tract symptoms 02/23/2017     Priority: Medium    Atopic dermatitis      Priority: Medium    Prostodynia      Priority: Medium    CARDIOVASCULAR SCREENING; LDL GOAL LESS THAN 130 10/31/2010     Priority: Medium        Past Medical/Surgical History:  Past Medical History:   Diagnosis Date    Agatston coronary artery calcium score between 100 and 199     " Score = 101.  Started on Lipitor 40mg and ASA 81mg daily in Nov 2023    Allergic rhinitis, cause unspecified     Atopic dermatitis     Benign non-nodular prostatic hyperplasia with lower urinary tract symptoms 02/23/2017    Hypothyroidism     Inguinal hernia without mention of obstruction or gangrene, unilateral or unspecified, (not specified as recurrent) 03/2007    left, s/p repair    Prostatitis, unspecified 01/2006    Prostodynia     Renal disease     HX OF UTIs    Thyroid nodule 11/18/2019    1 cm in isthmus     Past Surgical History:   Procedure Laterality Date    EXCISE LESION FACE  7/17/2013    Procedure: EXCISE LESION FACE;  EXCISION GLABELLAR LESION;  Surgeon: Chavez Cuenca MD;  Location: [facility]    HERNIA REPAIR      ZZC NONSPECIFIC PROCEDURE  2/08    Left inguinal hernia repair with mesh (Ultrapro hernia syste       Social History:  Social History     Socioeconomic History    Marital status:      Spouse name: Not on file    Number of children: Not on file    Years of education: Not on file    Highest education level: Not on file   Occupational History     Employer: [employer]     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 Not Asked   Social History Narrative    Not on file     Social Determinants of Health     Financial Resource Strain: Low Risk  (10/23/2023)    Financial Resource Strain     Within the past 12 months, have you or your family members you live with been unable to get utilities (heat, electricity) when it was really needed?: No   Food Insecurity: Low Risk  (10/23/2023)    Food Insecurity     Within the past 12 months, did  "you worry that your food would run out before you got money to buy more?: No     Within the past 12 months, did the food you bought just not last and you didn t have money to get more?: No   Transportation Needs: Low Risk  (10/23/2023)    Transportation Needs     Within the past 12 months, has lack of transportation kept you from medical appointments, getting your medicines, non-medical meetings or appointments, work, or from getting things that you need?: No   Physical Activity: Not on file   Stress: Not on file   Social Connections: Not on file   Interpersonal Safety: Low Risk  (10/23/2023)    Interpersonal Safety     Do you feel physically and emotionally safe where you currently live?: Yes     Within the past 12 months, have you been hit, slapped, kicked or otherwise physically hurt by someone?: No     Within the past 12 months, have you been humiliated or emotionally abused in other ways by your partner or ex-partner?: No   Housing Stability: Low Risk  (10/23/2023)    Housing Stability     Do you have housing? : Yes     Are you worried about losing your housing?: No       Family History:  Family History   Problem Relation Age of Onset    Alzheimer Disease Paternal Grandmother     Cancer - colorectal Paternal Grandfather         late 60s       Review of Systems:  A complete review of systems reviewed by me is negative with the exeption of what has been mentioned in the history of present illness.        Physical Examination:  Vitals: /78   Pulse 53   Ht 1.854 m (6' 1\")   Wt 84.3 kg (185 lb 12.8 oz)   SpO2 99%   BMI 24.51 kg/m    BMI= Body mass index is 24.51 kg/m .    Neck Cir (cm): 41 cm      GENERAL APPEARANCE: healthy, alert, no distress, and cooperative  EYES: Eyes grossly normal to inspection, PERRL, conjunctivae and sclerae normal, and lids and lashes normal  HENT: oropharynx crowded, soft palate dependent, and tongue base enlarged  NECK: no adenopathy, no asymmetry, masses, or scars, thyroid " "normal to palpation, and trachea midline and normal to palpation  RESP: lungs clear to auscultation - no rales, rhonchi or wheezes  CV: regular rates and rhythm, no murmur, click or rub, and no irregular beats  LYMPHATICS: no cervical adenopathy  MS: extremities normal- no gross deformities noted  NEURO: Normal strength and tone, mentation intact, and speech normal  Mallampati Class: IV.  Tonsillar Stage: 0  surgically removed.         Data: All pertinent previous laboratory data reviewed     Recent Labs   Lab Test 10/20/23  1032 10/03/22  1042    142   POTASSIUM 4.8 4.2   CHLORIDE 104 111*   CO2 29 30   ANIONGAP 7 1*   GLC 94 96   BUN 11.0 21   CR 1.07 1.26*   JANY 9.5 8.9       Recent Labs   Lab Test 10/20/23  1032   WBC 5.6   RBC 5.16   HGB 15.3   HCT 46.8   MCV 91   MCH 29.7   MCHC 32.7   RDW 12.6          Recent Labs   Lab Test 10/20/23  1032   PROTTOTAL 7.1   ALBUMIN 4.3   BILITOTAL 0.6   ALKPHOS 59   AST 31   ALT 17       TSH   Date Value   10/20/2023 1.79 uIU/mL   10/03/2022 1.13 mU/L   01/18/2021 1.62 mU/L   09/02/2020 1.44 mU/L       No results found for: \"UAMP\", \"UBARB\", \"BENZODIAZEUR\", \"UCANN\", \"UCOC\", \"OPIT\", \"UPCP\"    No results found for: \"IRONSAT\", \"[id number]\", \"JB\"    No results found for: \"PH\", \"PHARTERIAL\", \"PO2\", \"[id number]\", \"SAT\", \"PCO2\", \"HCO3\", \"BASEEXCESS\", \"PHIL\", \"BEB\"    @LABRCNTIPR(phv:4,pco2v:4,po2v:4,hco3v:4,malachi:4,o2per:4)@    Echocardiology: No results found for this or any previous visit (from the past 4320 hour(s)).    Chest x-ray: No results found for this or any previous visit from the past 365 days.      Chest CT: No results found for this or any previous visit from the past 365 days.      PFT: Most Recent [facility] Pulmonary Function Testing    No results found for: \"20001\"        Bennett Ezra Goltz, PA-C, [facility] 1/29/2024          "

## 2024-01-30 ENCOUNTER — OFFICE VISIT (OUTPATIENT)
Dept: SLEEP MEDICINE | Facility: CLINIC | Age: 64
End: 2024-01-30
Attending: INTERNAL MEDICINE
Payer: COMMERCIAL

## 2024-01-30 VITALS
HEART RATE: 53 BPM | DIASTOLIC BLOOD PRESSURE: 78 MMHG | OXYGEN SATURATION: 99 % | WEIGHT: 185.8 LBS | HEIGHT: 73 IN | BODY MASS INDEX: 24.63 KG/M2 | SYSTOLIC BLOOD PRESSURE: 113 MMHG

## 2024-01-30 DIAGNOSIS — R06.83 SNORING: Primary | ICD-10-CM

## 2024-01-30 DIAGNOSIS — G47.61 PERIODIC LIMB MOVEMENT DISORDER: ICD-10-CM

## 2024-01-30 DIAGNOSIS — Z82.0 FAMILY HISTORY OF SLEEP APNEA: ICD-10-CM

## 2024-01-30 DIAGNOSIS — F51.01 PRIMARY INSOMNIA: ICD-10-CM

## 2024-01-30 DIAGNOSIS — R61 NIGHT SWEAT: ICD-10-CM

## 2024-01-30 PROCEDURE — 99204 OFFICE O/P NEW MOD 45 MIN: CPT | Performed by: PHYSICIAN ASSISTANT

## 2024-01-30 ASSESSMENT — SLEEP AND FATIGUE QUESTIONNAIRES
HOW LIKELY ARE YOU TO NOD OFF OR FALL ASLEEP WHILE WATCHING TV: MODERATE CHANCE OF DOZING
HOW LIKELY ARE YOU TO NOD OFF OR FALL ASLEEP WHILE SITTING AND TALKING TO SOMEONE: WOULD NEVER DOZE
HOW LIKELY ARE YOU TO NOD OFF OR FALL ASLEEP WHEN YOU ARE A PASSENGER IN A CAR FOR AN HOUR WITHOUT A BREAK: SLIGHT CHANCE OF DOZING
HOW LIKELY ARE YOU TO NOD OFF OR FALL ASLEEP WHILE SITTING INACTIVE IN A PUBLIC PLACE: WOULD NEVER DOZE
HOW LIKELY ARE YOU TO NOD OFF OR FALL ASLEEP WHILE SITTING QUIETLY AFTER LUNCH WITHOUT ALCOHOL: WOULD NEVER DOZE
HOW LIKELY ARE YOU TO NOD OFF OR FALL ASLEEP IN A CAR, WHILE STOPPED FOR A FEW MINUTES IN TRAFFIC: WOULD NEVER DOZE
HOW LIKELY ARE YOU TO NOD OFF OR FALL ASLEEP WHILE LYING DOWN TO REST IN THE AFTERNOON WHEN CIRCUMSTANCES PERMIT: WOULD NEVER DOZE
HOW LIKELY ARE YOU TO NOD OFF OR FALL ASLEEP WHILE SITTING AND READING: WOULD NEVER DOZE

## 2024-01-30 NOTE — NURSING NOTE
"Chief Complaint   Patient presents with    Sleep Problem     Snoring, sweating, restless leg, dry mouth       Initial /78   Pulse 53   Ht 1.854 m (6' 1\")   Wt 84.3 kg (185 lb 12.8 oz)   SpO2 99%   BMI 24.51 kg/m   Estimated body mass index is 24.51 kg/m  as calculated from the following:    Height as of this encounter: 1.854 m (6' 1\").    Weight as of this encounter: 84.3 kg (185 lb 12.8 oz).    Medication Reconciliation: complete  ESS 3  Neck circumference:41 centimeters.  Jorge Alberto Cook MA     "

## 2024-01-30 NOTE — PATIENT INSTRUCTIONS
"          MY TREATMENT INFORMATION FOR SLEEP APNEA-  Vaughn Henderson    DOCTOR : Bennett Goltz, PA-C    Am I having a sleep study at a sleep center?  --->Due to normal delays, you will be contacted within 2-4 weeks to schedule    Am I having a home sleep study?  --->Watch the video for the device you are using:    -/drop off device-   https://www.PocketSuite.com/watch?v=yGGFBdELGhk      Frequently asked questions:  1. What is Obstructive Sleep Apnea (PRASHANT)? PRASHANT is the most common type of sleep apnea. Apnea means, \"without breath.\"  Apnea is most often caused by narrowing or collapse of the upper airway as muscles relax during sleep.   Almost everyone has occasional apneas. Most people with sleep apnea have had brief interruptions at night frequently for many years.  The severity of sleep apnea is related to how frequent and severe the events are.   2. What are the consequences of PRASHANT? Symptoms include: feeling sleepy during the day, snoring loudly, gasping or stopping of breathing, trouble sleeping, and occasionally morning headaches or heartburn at night.  Sleepiness can be serious and even increase the risk of falling asleep while driving. Other health consequences may include development of high blood pressure and other cardiovascular disease in persons who are susceptible. Untreated PRASHANT  can contribute to heart disease, stroke and diabetes.   3. What are the treatment options? In most situations, sleep apnea is a lifelong disease that must be managed with daily therapy. Medications are not effective for sleep apnea and surgery is generally not considered until other therapies have been tried. Your treatment is your choice . Continuous Positive Airway (CPAP) works right away and is the therapy that is effective in nearly everyone. An oral device to hold your jaw forward is usually the next most reliable option. Other options include postioning devices (to keep you off your back), weight loss, and surgery " including a tongue pacing device. There is more detail about some of these options below.  4. Are my sleep studies covered by insurance? Although we will request verification of coverage, we advise you also check in advance of the study to ensure there is coverage.    Important tips for those choosing CPAP and similar devices  For new devices, sign up for device GARRETT to monitor your device for your followup visits  We encourage you to utilize the [employer] garrett or website (myAir web (resmed.com) ) to monitor your therapy progress and share the data with your healthcare team when you discuss your sleep apnea.                                                    Know your equipment:  CPAP is continuous positive airway pressure that prevents obstructive sleep apnea by keeping the throat from collapsing while you are sleeping. In most cases, the device is  smart  and can slowly self-adjusts if your throat collapses and keeps a record every day of how well you are treated-this information is available to you and your care team.  BPAP is bilevel positive airway pressure that keeps your throat open and also assists each breath with a pressure boost to maintain adequate breathing.  Special kinds of BPAP are used in patients who have inadequate breathing from lung or heart disease. In most cases, the device is  smart  and can slowly self-adjusts to assist breathing. Like CPAP, the device keeps a record of how well you are treated.  Your mask is your connection to the device. You get to choose what feels most comfortable and the staff will help to make sure if fits. Here: are some examples of the different masks that are available: Magnetic mask aids may assist with use but there are safety issues that should be addressed when considering with magnets* ( see end of discussion).       Key points to remember on your journey with sleep apnea:  Sleep study.  PAP devices often need to be adjusted during a sleep study to show  that they are effective and adjusted right.  Good tips to remember: Try wearing just the mask during a quiet time during the day so your body adapts to wearing it. A humidifier is recommended for comfort in most cases to prevent drying of your nose and throat. Allergy medication from your provider may help you if you are having nasal congestion.  Getting settled-in. It takes more than one night for most of us to get used to wearing a mask. Try wearing just the mask during a quiet time during the day so your body adapts to wearing it. A humidifier is recommended for comfort in most cases. Our team will work with you carefully on the first day and will be in contact within 4 days and again at 2 and 4 weeks for advice and remote device adjustments. Your therapy is evaluated by the device each day.   Use it every night. The more you are able to sleep naturally for 7-8 hours, the more likely you will have good sleep and to prevent health risks or symptoms from sleep apnea. Even if you use it 4 hours it helps. Occasionally all of us are unable to use a medical therapy, in sleep apnea, it is not dangerous to miss one night.   Communicate. Call our skilled team on the number provided on the first day if your visit for problems that make it difficult to wear the device. Over 2 out of 3 patients can learn to wear the device long-term with help from our team. Remember to call our team or your sleep providers if you are unable to wear the device as we may have other solutions for those who cannot adapt to mask CPAP therapy. It is recommended that you sleep your sleep provider within the first 3 months and yearly after that if you are not having problems.   Use it for your health. We encourage use of CPAP masks during daytime quiet periods to allow your face and brain to adapt to the sensation of CPAP so that it will be a more natural sensation to awaken to at night or during naps. This can be very useful during the first few  weeks or months of adapting to CPAP though it does not help medically to wear CPAP during wakefulness and  should not be used as a strategy just to meet guidelines.  Take care of your equipment. Make sure you clean your mask and tubing using directions every day and that your filter and mask are replaced as recommended or if they are not working.     *Masks with magnets:  Updated Contraindications  Masks with magnetic components are contraindicated for use by patients where they, or anyone in close physical contact while using the mask, have the following:   Active medical implants that interact with magnets (i.e., pacemakers, implantable cardioverter defibrillators (ICD), neurostimulators, cerebrospinal fluid (CSF) shunts, insulin/infusion pumps)   Metallic implants/objects containing ferromagnetic material (i.e., aneurysm clips/flow disruption devices, embolic coils, stents, valves, electrodes, implants to restore hearing or balance with implanted magnets, ocular implants, metallic splinters in the eye)  Updated Warning  Keep the mask magnets at a safe distance of at least 6 inches (150 mm) away from implants or medical devices that may be adversely affected by magnetic interference. This warning applies to you or anyone in close physical contact with your mask. The magnets are in the frame and lower headgear clips, with a magnetic field strength of up to 400mT. When worn, they connect to secure the mask but may inadvertently detach while asleep.  Implants/medical devices, including those listed within contraindications, may be adversely affected if they change function under external magnetic fields or contain ferromagnetic materials that attract/repel to magnetic fields (some metallic implants, e.g., contact lenses with metal, dental implants, metallic cranial plates, screws, maryan hole covers, and bone substitute devices). Consult your physician and  of your implant / other medical device for  information on the potential adverse effects of magnetic fields.    BESIDES CPAP, WHAT OTHER THERAPIES ARE THERE?    Positioning Device  Positioning devices are generally used when sleep apnea is mild and only occurs on your back.This example shows a pillow that straps around the waist. It may be appropriate for those whose sleep study shows milder sleep apnea that occurs primarily when lying flat on one's back. Preliminary studies have shown benefit but effectiveness at home may need to be verified by a home sleep test. These devices are generally not covered by medical insurance.  Examples of devices that maintain sleeping on the back to prevent snoring and mild sleep apnea.    Belt type body positioner  http://Xactium/    Electronic reminder  http://nightshifttherapy.com/            Oral Appliance  What is oral appliance therapy?  An oral appliance device fits on your teeth at night like a retainer used after having braces. The device is made by a specialized dentist and requires several visits over 1-2 months before a manufactured device is made to fit your teeth and is adjusted to prevent your sleep apnea. Once an oral device is working properly, snoring should be improved. A home sleep test may be recommended at that time if to determine whether the sleep apnea is adequately treated.       Some things to remember:  -Oral devices are often, but not always, covered by your medical insurance. Be sure to check with your insurance provider.   -If you are referred for oral therapy, you will be given a list of specialized dentists to consider or you may choose to visit the Web site of the American Academy of Dental Sleep Medicine  -Oral devices are less likely to work if you have severe sleep apnea or are extremely overweight.     More detailed information  An oral appliance is a small acrylic device that fits over the upper and lower teeth  (similar to a retainer or a mouth guard). This device slightly moves  jaw forward, which moves the base of the tongue forward, opens the airway, improves breathing for effective treat snoring and obstructive sleep apnea in perhaps 7 out of 10 people .  The best working devices are custom-made by a dental device  after a mold is made of the teeth 1, 2, 3.  When is an oral appliance indicated?  Oral appliance therapy is recommended as a first-line treatment for patients with primary snoring, mild sleep apnea, and for patients with moderate sleep apnea who prefer appliance therapy to use of CPAP4, 5. Severity of sleep apnea is determined by sleep testing and is based on the number of respiratory events per hour of sleep.   How successful is oral appliance therapy?  The success rate of oral appliance therapy in patients with mild sleep apnea is 75-80% while in patients with moderate sleep apnea it is 50-70%. The chance of success in patients with severe sleep apnea is 40-50%. The research also shows that oral appliances have a beneficial effect on the cardiovascular health of PRASHANT patients at the same magnitude as CPAP therapy7.  Oral appliances should be a second-line treatment in cases of severe sleep apnea, but if not completely successful then a combination therapy utilizing CPAP plus oral appliance therapy may be effective. Oral appliances tend to be effective in a broad range of patients although studies show that the patients who have the highest success are females, younger patients, those with milder disease, and less severe obesity. 3, 6.   Finding a dentist that practices dental sleep medicine  Specific training is available through the American Academy of Dental Sleep Medicine for dentists interested in working in the field of sleep. To find a dentist who is educated in the field of sleep and the use of oral appliances, near you, visit the Web site of the American Academy of Dental Sleep Medicine.    References  1. clive Gleason al. Objectively measured vs  self-reported compliance during oral appliance therapy for sleep-disordered breathing. Chest 2013; 144(5): 1605-4750.  2. Jennifer, et al. Objective measurement of compliance during oral appliance therapy for sleep-disordered breathing. Thorax 2013; 68(1): 91-96.  3. Angus et al. Mandibular advancement devices in 620 men and women with PRASHANT and snoring: tolerability and predictors of treatment success. Chest 2004; 125: 6897-8774.  4. Akanksha et al. Oral appliances for snoring and PRASHANT: a review. Sleep 2006; 29: 244-262.  5. Shannan et al. Oral appliance treatment for PRASHANT: an update. J Clin Sleep Med 2014; 10(2): 215-227.  6. Irene et al. Predictors of OSAH treatment outcome. J Dent Res 2007; 86: 3992-2348.      Weight Loss:    Weight loss is a long-term strategy that may improve sleep apnea in some patients.    Weight management is a personal decision and the decision should be based on your interest and the potential benefits.  If you are interested in exploring weight loss strategies, the following discussion covers the impact on weight loss on sleep apnea and the approaches that may be successful.    Being overweight does not necessarily mean you will have health consequences.  Those who have BMI over 35 or over 27 with existing medical conditions carries greater risk.   Weight loss decreases severity of sleep apnea in most people with obesity. For those with mild obesity who have developed snoring with weight gain, even 15-30 pound weight loss can improve and occasionally eliminate sleep apnea.  Structured and life-long dietary and health habits are necessary to lose weight and keep healthier weight levels.     Though there may be significant health benefits from weight loss, long-term weight loss is very difficult to achieve- studies show success with dietary management in less than 10% of people. In addition, substantial weight loss may require years of dietary control and may be difficult if  patients have severe obesity. In these cases, surgical management may be considered.  Finally, older individuals who have tolerated obesity without health complications may be less likely to benefit from weight loss strategies.      Body mass index is 24.51 kg/m .    Surgery:    Surgery for obstructive sleep apnea is considered generally only when other therapies fail to work. Surgery may be discussed with you if you are having a difficult time tolerating CPAP and or when there is an abnormal structure that requires surgical correction.  Nose and throat surgeries often enlarge the airway to prevent collapse.  Most of these surgeries create pain for 1-2 weeks and up to half of the most common surgeries are not effective throughout life.  You should carefully discuss the benefits and drawbacks to surgery with your sleep provider and surgeon to determine if it is the best solution for you.   More information  Surgery for PRASHANT is directed at areas that are responsible for narrowing or complete obstruction of the airway during sleep.  There are a wide range of procedures available to enlarge and/or stabilize the airway to prevent blockage of breathing in the three major areas where it can occur: the palate, tongue, and nasal regions.  Successful surgical treatment depends on the accurate identification of the factors responsible for obstructive sleep apnea in each person.  A personalized approach is required because there is no single treatment that works well for everyone.  Because of anatomic variation, consultation with an examination by a sleep surgeon is a critical first step in determining what surgical options are best for each patient.  In some cases, examination during sedation may be recommended in order to guide the selection of procedures.  Patients will be counseled about risks and benefits as well as the typical recovery course after surgery. Surgery is typically not a cure for a person s PRASHANT.  However,  surgery will often significantly improve one s PRASHANT severity (termed  success rate ).  Even in the absence of a cure, surgery will decrease the cardiovascular risk associated with OSA7; improve overall quality of life8 (sleepiness, functionality, sleep quality, etc).    Palate Procedures:  Patients with PRASHANT often have narrowing of their airway in the region of their tonsils and uvula.  The goals of palate procedures are to widen the airway in this region as well as to help the tissues resist collapse.  Modern palate procedure techniques focus on tissue conservation and soft tissue rearrangement, rather than tissue removal.  Often the uvula is preserved in this procedure. Residual sleep apnea is common in patient after pharyngoplasty with an average reduction in sleep apnea events of 33%2.    Tongue Procedures:  ExamWhile patients are awake, the muscles that surround the throat are active and keep this region open for breathing. These muscles relax during sleep, allowing the tongue and other structures to collapse and block breathing.  There are several different tongue procedures available.  Selection of a tongue base procedure depends on characteristics seen on physical exam.  Generally, procedures are aimed at removing bulky tissues in this area or preventing the back of the tongue from falling back during sleep.  Success rates for tongue surgery range from 50-62%3.  Hypoglossal Nerve Stimulation:  Hypoglossal nerve stimulation has recently received approval from the [place] Food and Drug Administration for the treatment of obstructive sleep apnea.  This is based on research showing that the system was safe and effective in treating sleep apnea6.  Results showed that the median AHI score decreased 68%, from 29.3 to 9.0. This therapy uses an implant system that senses breathing patterns and delivers mild stimulation to airway muscles, which keeps the airway open during sleep.  The system consists of three  fully implanted components: a small generator (similar in size to a pacemaker), a breathing sensor, and a stimulation lead.  Using a small handheld remote, a patient turns the therapy on before bed and off upon awakening.    Candidates for this device must be greater than 18 years of age, have moderate to severe obstructive sleep apnea with less than 25% central events  (AHI between 15-65), BMI less than 35, have tried CPAP/oral appliance for at least 8 weeks without success, and have appropriate upper airway anatomy (determined by a sleep endoscopy performed by Dr. Rayray Knox or Dr. Oumar Lang).    Nasal Procedures:  Nasal obstruction can interfere with nasal breathing during the day and night.  Studies have shown that relief of nasal obstruction can improve the ability of some patients to tolerate positive airway pressure therapy for obstructive sleep apnea1.  Treatment options include medications such as nasal saline, topical corticosteroid and antihistamine sprays, and oral medications such as antihistamines or decongestants. Non-surgical treatments can include external nasal dilators for selected patients. If these are not successful by themselves, surgery can improve the nasal airway either alone or in combination with these other options.        Combination Procedures:  Combination of surgical procedures and other treatments may be recommended, particularly if patients have more than one area of narrowing or persistent positional disease.  The success rate of combination surgery ranges from 66-80%2,3.    References  Buck RIVAS. The Role of the Nose in Snoring and Obstructive Sleep Apnoea: An Update.  Eur Arch Otorhinolaryngol. 2011; 268: 1365-73.   Carlos Enrique SM; Leslie JA; Sd JR; Pallanch JF; Michael MB; Lorena SG; Radha GONZALES. Surgical modifications of the upper airway for obstructive sleep apnea in adults: a systematic review and meta-analysis. SLEEP 2010;33(10):2848-2553. Valdo PERRIN. Hypopharyngeal  surgery in obstructive sleep apnea: an evidence-based medicine review.  Arch Otolaryngol Head Neck Surg. 2006 Feb;132(2):206-13.  Prashanth YH1, Ann Y, Max JUSTIN. The efficacy of anatomically based multilevel surgery for obstructive sleep apnea. Otolaryngol Head Neck Surg. 2003 Oct;129(4):327-35.  Valdo E, Goldberg A. Hypopharyngeal Surgery in Obstructive Sleep Apnea: An Evidence-Based Medicine Review. Arch Otolaryngol Head Neck Surg. 2006 Feb;132(2):206-13.  Chato WILLETT et al. Upper-Airway Stimulation for Obstructive Sleep Apnea.  N Engl J Med. 2014 Jan 9;370(2):139-49.  Theodora Y et al. Increased Incidence of Cardiovascular Disease in Middle-aged Men with Obstructive Sleep Apnea. Am J Respir Crit Care Med; 2002 166: 159-165  Easleygisell CHAPMAN et al. Studying Life Effects and Effectiveness of Palatopharyngoplasty (SLEEP) study: Subjective Outcomes of Isolated Uvulopalatopharyngoplasty. Otolaryngol Head Neck Surg. 2011; 144: 623-631.    WHAT IF I ONLY HAVE SNORING?    Mandibular advancement devices, lateral sleep positioning, long-term weight loss and treatment of nasal allergies have been shown to improve snoring.  Exercising tongue muscles with a game (https://BluPanda.FanBoom/us/garrett/soundly-reduce-snoring/se8481917629) or stimulating the tongue during the day with a device (https://doi.org/10.3390/nqh23789542) have improved snoring in some individuals.  https://www.Zdorovio.Telepathy/  https://www.sleepfoundation.org/best-anti-snoring-mouthpieces-and-mouthguards    Remember to Drive Safe... Drive Alive     Sleep health profoundly affects your health, mood, and your safety.  Thirty three percent of the population (one in three of us) is not getting enough sleep and many have a sleep disorder. Not getting enough sleep or having an untreated / undertreated sleep condition may make us sleepy without even knowing it. In fact, our driving could be dramatically impaired due to our sleep health. As your provider, here are some things I  would like you to know about driving:     Here are some warning signs for impairment and dangerous drowsy driving:              -Having been awake more than 16 hours               -Looking tired               -Eyelid drooping              -Head nodding (it could be too late at this point)              -Driving for more than 30 minutes     Some things you could do to make the driving safer if you are experiencing some drowsiness:              -Stop driving and rest              -Call for transportation              -Make sure your sleep disorder is adequately treated     Some things that have been shown NOT to work when experiencing drowsiness while driving:              -Turning on the radio              -Opening windows              -Eating any  distracting  /  entertaining  foods (e.g., sunflower seeds, candy, or any other)              -Talking on the phone      Your decision may not only impact your life, but also the life of others. Please, remember to drive safe for yourself and all of us.

## 2024-02-13 ENCOUNTER — E-VISIT (OUTPATIENT)
Dept: INTERNAL MEDICINE | Facility: CLINIC | Age: 64
End: 2024-02-13
Payer: COMMERCIAL

## 2024-02-13 DIAGNOSIS — R93.1 AGATSTON CORONARY ARTERY CALCIUM SCORE BETWEEN 100 AND 199: Primary | ICD-10-CM

## 2024-02-13 DIAGNOSIS — Z12.5 SCREENING FOR PROSTATE CANCER: ICD-10-CM

## 2024-02-13 DIAGNOSIS — E78.5 HYPERLIPIDEMIA LDL GOAL <70: ICD-10-CM

## 2024-02-13 PROCEDURE — 99207 PR NON-BILLABLE SERV PER CHARTING: CPT | Performed by: INTERNAL MEDICINE

## 2024-02-14 NOTE — TELEPHONE ENCOUNTER
Provider E-Visit time total (minutes):  Spoke with pt by phone 2/14/24. Reviewed recent sleep clinic note and plan for sleep study. Has made significant diet changes and mostly eating vegetarian now and exercising frequently. Had some myalgias with Atorvastatin that resolved off of med. . Will repeat FLP along with PSA screen in early March 2024. Pt will schedule lab appt.  If LDL remains > 70 with prior agaston score, will have pt start trial of Crestor.  Spoke 10 min

## 2024-02-15 PROBLEM — E78.5 HYPERLIPIDEMIA LDL GOAL <70: Status: ACTIVE | Noted: 2024-02-15

## 2024-02-26 ENCOUNTER — OFFICE VISIT (OUTPATIENT)
Dept: SLEEP MEDICINE | Facility: CLINIC | Age: 64
End: 2024-02-26
Payer: COMMERCIAL

## 2024-02-26 DIAGNOSIS — Z82.0 FAMILY HISTORY OF SLEEP APNEA: ICD-10-CM

## 2024-02-26 DIAGNOSIS — R61 NIGHT SWEAT: ICD-10-CM

## 2024-02-26 DIAGNOSIS — R06.83 SNORING: ICD-10-CM

## 2024-02-26 PROCEDURE — G0399 HOME SLEEP TEST/TYPE 3 PORTA: HCPCS | Mod: 52 | Performed by: INTERNAL MEDICINE

## 2024-02-26 NOTE — PROGRESS NOTES
Pt is completing a home sleep test. Pt was instructed on how to put on the Noxturnal T3 device and associated equipment before going to bed and given the opportunity to practice putting it on before leaving the sleep center. Pt was reminded to bring the home sleep test kit back to the center tomorrow, at agreed upon time for download and reporting.   Neck circumference: 41 CM / 16 inches.  Cayla Burt CMA on 2/26/2024 at 1:04 PM

## 2024-02-27 ENCOUNTER — DOCUMENTATION ONLY (OUTPATIENT)
Dept: SLEEP MEDICINE | Facility: CLINIC | Age: 64
End: 2024-02-27
Payer: COMMERCIAL

## 2024-02-27 NOTE — NURSING NOTE
Pt returned HST device. It was downloaded and forwarded data to the clinical specialist for scoring.  Cayla Burt CMA on 2/27/2024 at 9:07 AM

## 2024-02-27 NOTE — PROGRESS NOTES
"HST POST-STUDY QUESTIONNAIRE    What time did you go to bed?  11:00  How long do you think it took to fall asleep?  About an hour, got to sleep around midnight.  What time did you wake up to start the day?  5:30  Did you get up during the night at all?  Once for bathroom  If you woke up, do you remember approximately what time(s)? I was still up when I went to bathroom early.  Did you have any difficulty with the equipment?  No  Did you us any type of treatment with this study?  Oral Appliance  Was the head of the bed elevated? No  Did you sleep in a recliner?  No  Did you stop using CPAP at least 3 days before this test?  NA  Any other information you'd like us to know? Although I did not fall asleep right away once I got to sleep it was a fairly typical \" sleep\" for me so I do believe last night.     "

## 2024-02-27 NOTE — PROCEDURES
"HOME SLEEP STUDY INTERPRETATION        Patient: Vaughn Hednerson  MRN: [id number]  YOB: 1960  Study Date: 2/26/2024  PCP/Referring Provider: Zhao Mitchell;   Ordering Provider: Bennett Goltz, PA-C         Indications for Home Study: Vaughn Henderson is a 64 year old male who presents with symptoms suggestive of obstructive sleep apnea.    Estimated body mass index is 24.51 kg/m  as calculated from the following:    Height as of 1/30/24: 1.854 m (6' 1\").    Weight as of 1/30/24: 84.3 kg (185 lb 12.8 oz).  Total score - Henry: 3 (1/30/2024  8:48 AM)      Data: A full night home sleep study was performed recording the standard physiologic parameters including body position, movement, sound, nasal pressure, thermal oral airflow, chest and abdominal movements with respiratory inductance plethysmography, and oxygen saturation by pulse oximetry. Pulse rate was estimated by oximetry recording. This study was considered adequate based on > 4 hours of quality oximetry and respiratory recording. As specified by the AASM Manual for the Scoring of Sleep and Associated events, version 2.3, Rule VIII.D 1B, 4% oxygen desaturation scoring for hypopneas is used as a standard of care on all home sleep apnea testing.        Analysis Time:  347.9 minutes        Respiration:   Sleep Associated Hypoxemia: sustained hypoxemia was present. Baseline oxygen saturation was 90%.  Time with saturation less than or equal to 88% was 11.5 minutes. The lowest oxygen saturation was 78%.   Snoring: Snoring was present.  Respiratory events: The home study revealed a presence of 33 obstructive apneas and 17 mixed and central apneas. There were 33 hypopneas resulting in a combined apnea/hypopnea index [AHI] of 14.3 events per hour.  AHI was 20.4 per hour supine, N/A per hour prone, 3.7 per hour on left side, and 26.9 per hour on right side.   Pattern: Excluding events noted above, respiratory rate and pattern was Normal.      Position: Percent " of time spent: supine - 60%, prone - 0%, on left - 37.3%, on right - 2.6%.      Heart Rate: By pulse oximetry normal rate was noted.       Assessment:   Mild obstructive sleep apnea.  Majority of sleep apnea events were obstructive in nature. Minor degree of sporadic central and mixed apneas were also seen.   Sleep associated hypoxemia was present.    Recommendations:  Depending on clinical indications for treatment of mild obstructive sleep apnea, following therapy options can be considered.  If there is excessive daytime sleepiness or other qualifying medical comorbidity, consider auto titrating CPAP therapy for treatment.  Alternatively, patient can consider oral appliance therapy through referral to dental sleep medicine for sleep apnea.      Diagnosis Code(s): Obstructive Sleep Apnea G47.33, Hypoxemia G47.36    Electronically signed by: Eliud Larios MD, February 27, 2024   Diplomate, American Board of Psychiatry and Neurology, Sleep Medicine

## 2024-02-27 NOTE — PROGRESS NOTES
This HSAT was performed using a Noxturnal T3 device which recorded snore, sound, movement activity, body position, nasal pressure, oronasal thermal airflow, pulse, oximetry and both chest and abdominal respiratory effort. HSAT data was restricted to the time patient states they were in bed.     HSAT was scored using 1B 4% hypopnea rule.     AHI: 14.3.  Snoring was reported as loud.  Time with SpO2 below 89% was 15.5 minutes.   Overall signal quality was good     Pt will follow up with sleep provider to determine appropriate therapy.     Ordering Provider, Goltz, Bennett Ezra, PA-C C. Oyugi, BA, RPSGT, RST System Clinical Specialist/ 2/27/2024

## 2024-05-06 ENCOUNTER — LAB (OUTPATIENT)
Dept: LAB | Facility: CLINIC | Age: 64
End: 2024-05-06
Payer: COMMERCIAL

## 2024-05-06 DIAGNOSIS — Z12.5 SCREENING FOR PROSTATE CANCER: ICD-10-CM

## 2024-05-06 DIAGNOSIS — E78.5 HYPERLIPIDEMIA LDL GOAL <70: ICD-10-CM

## 2024-05-06 LAB
CHOLEST SERPL-MCNC: 156 MG/DL
FASTING STATUS PATIENT QL REPORTED: YES
HDLC SERPL-MCNC: 40 MG/DL
LDLC SERPL CALC-MCNC: 104 MG/DL
NONHDLC SERPL-MCNC: 116 MG/DL
PSA SERPL DL<=0.01 NG/ML-MCNC: 4.53 NG/ML (ref 0–4.5)
TRIGL SERPL-MCNC: 61 MG/DL

## 2024-05-06 PROCEDURE — 36415 COLL VENOUS BLD VENIPUNCTURE: CPT

## 2024-05-06 PROCEDURE — G0103 PSA SCREENING: HCPCS

## 2024-05-06 PROCEDURE — 80061 LIPID PANEL: CPT

## 2024-05-07 ENCOUNTER — MYC MEDICAL ADVICE (OUTPATIENT)
Dept: INTERNAL MEDICINE | Facility: CLINIC | Age: 64
End: 2024-05-07
Payer: COMMERCIAL

## 2024-05-16 ENCOUNTER — TRANSFERRED RECORDS (OUTPATIENT)
Dept: HEALTH INFORMATION MANAGEMENT | Facility: CLINIC | Age: 64
End: 2024-05-16
Payer: COMMERCIAL

## 2024-05-17 ENCOUNTER — MYC MEDICAL ADVICE (OUTPATIENT)
Dept: INTERNAL MEDICINE | Facility: CLINIC | Age: 64
End: 2024-05-17
Payer: COMMERCIAL

## 2024-05-17 DIAGNOSIS — R39.11 BENIGN PROSTATIC HYPERPLASIA WITH URINARY HESITANCY: ICD-10-CM

## 2024-05-17 DIAGNOSIS — N40.1 BENIGN PROSTATIC HYPERPLASIA WITH URINARY HESITANCY: ICD-10-CM

## 2024-05-17 DIAGNOSIS — E78.5 HYPERLIPIDEMIA LDL GOAL <70: Primary | ICD-10-CM

## 2024-05-17 RX ORDER — ROSUVASTATIN CALCIUM 10 MG/1
10 TABLET, COATED ORAL DAILY
Qty: 30 TABLET | Refills: 11 | Status: SHIPPED | OUTPATIENT
Start: 2024-05-17 | End: 2024-06-11 | Stop reason: SINTOL

## 2024-05-17 RX ORDER — TAMSULOSIN HYDROCHLORIDE 0.4 MG/1
0.4 CAPSULE ORAL DAILY
Qty: 90 CAPSULE | Refills: 3 | Status: SHIPPED | OUTPATIENT
Start: 2024-05-17

## 2024-05-17 NOTE — TELEPHONE ENCOUNTER
See below, pt requesting an Rx for Monie DONALD Triage RN  [facility]

## 2024-06-03 ENCOUNTER — MYC MEDICAL ADVICE (OUTPATIENT)
Dept: INTERNAL MEDICINE | Facility: CLINIC | Age: 64
End: 2024-06-03
Payer: COMMERCIAL

## 2024-06-03 DIAGNOSIS — E78.5 HYPERLIPIDEMIA LDL GOAL <70: ICD-10-CM

## 2024-06-11 RX ORDER — ROSUVASTATIN CALCIUM 10 MG/1
10 TABLET, COATED ORAL DAILY
Qty: 30 TABLET | Refills: 11 | Status: SHIPPED | OUTPATIENT
Start: 2024-06-11

## 2024-09-09 ENCOUNTER — MYC MEDICAL ADVICE (OUTPATIENT)
Dept: SLEEP MEDICINE | Facility: CLINIC | Age: 64
End: 2024-09-09
Payer: COMMERCIAL

## 2024-09-09 DIAGNOSIS — F51.01 PRIMARY INSOMNIA: ICD-10-CM

## 2024-09-09 DIAGNOSIS — G47.33 OSA (OBSTRUCTIVE SLEEP APNEA): Primary | ICD-10-CM

## 2024-09-10 NOTE — TELEPHONE ENCOUNTER
We reviewed treatment options including CPAP, dental appliance, weight loss, positional restriction and ENT surgery. The patient was interested in CPAP. I am prescribing auto CPAP 5-15 cm, heated humidifier and compliance meter. The patient was informed of the mask exchange policy and the compliance goals. They were also informed that they would be followed by the sleep therapy management team during the first month of CPAP use. He was encouraged to schedule a follow-up in about 3 months.  Bennett Goltz, PA-C

## 2024-09-23 ENCOUNTER — PATIENT OUTREACH (OUTPATIENT)
Dept: CARE COORDINATION | Facility: CLINIC | Age: 64
End: 2024-09-23
Payer: COMMERCIAL

## 2024-10-01 ENCOUNTER — DOCUMENTATION ONLY (OUTPATIENT)
Dept: SLEEP MEDICINE | Facility: CLINIC | Age: 64
End: 2024-10-01
Payer: COMMERCIAL

## 2024-10-01 ENCOUNTER — APPOINTMENT (OUTPATIENT)
Dept: SLEEP MEDICINE | Facility: CLINIC | Age: 64
End: 2024-10-01
Payer: COMMERCIAL

## 2024-10-01 DIAGNOSIS — F51.01 PRIMARY INSOMNIA: ICD-10-CM

## 2024-10-01 DIAGNOSIS — G47.33 OSA (OBSTRUCTIVE SLEEP APNEA): Primary | ICD-10-CM

## 2024-10-01 NOTE — PROGRESS NOTES
Patient was offered choice of vendor and chose [facility].  Patient Vaughn Henderson was set up at [facility]  on October 1, 2024. Patient received a Resmed Airsense 11. Pressures were set at  5-15 cmH2O.  Patient s ramp is 5 cmH2O for auto and FLEX/EPR is EPR, 2. Patient received a [employer] mask name: Vitera full face mask size large, heated tubing and heated humidifier. Patient does not need to meet compliance.   Madonna Mckeon

## 2024-10-04 ENCOUNTER — DOCUMENTATION ONLY (OUTPATIENT)
Dept: SLEEP MEDICINE | Facility: CLINIC | Age: 64
End: 2024-10-04
Payer: COMMERCIAL

## 2024-10-04 NOTE — PROGRESS NOTES
3 day Sleep therapy management telephone visit    Diagnostic AHI:    HST: 14.3        SUBJECTIVE: Patient reports doing ok with CPAP. He is having a tough time getting used to it but denies any specific discomforts. Pt will continue working towards getting used to it.   Patient was given my contact information and instructed to reach out with any questions or concerns.       Order settings:  CPAP MIN CPAP MAX   5 cm H2O 15 cm H2O         Device settings:  CPAP MIN CPAP MAX EPR RESMED SOFT RESPONSE SETTING   5.0 cm  H20 15.0 cm  H20 TWO OFF         Patient has the following upcoming sleep appts:      Replacement device: No  STM ordered by provider: Yes     Total time spent on accessing and  interpreting remote patient PAP therapy data  10 minutes    Total time spent counseling, coaching  and reviewing PAP therapy data with patient  3 minutes

## 2024-10-07 ENCOUNTER — PATIENT OUTREACH (OUTPATIENT)
Dept: CARE COORDINATION | Facility: CLINIC | Age: 64
End: 2024-10-07
Payer: COMMERCIAL

## 2024-10-07 ENCOUNTER — TELEPHONE (OUTPATIENT)
Dept: INTERNAL MEDICINE | Facility: CLINIC | Age: 64
End: 2024-10-07
Payer: COMMERCIAL

## 2024-10-07 NOTE — TELEPHONE ENCOUNTER
Call pt and schedule him to be seen sometime in October as requested for preventative exam and follow-up cholesterol, thyroid issues. May use any current open appointment slot to schedule the appointment except for a preop/hospital follow-up slot  or end of day virtual appt slot. Also scheduled pt for fasting lab appt in the week prior to the appt with me so that I can review results with pt when seen

## 2024-10-07 NOTE — TELEPHONE ENCOUNTER
Reason for Call:  Appointment Request    Patient requesting this type of appt:  Preventive     Requested provider: Zhao Mitchell    Reason patient unable to be scheduled: Not within requested timeframe    When does patient want to be seen/preferred time:  Month of October    Comments: Patient would like to schedule his annual visit with PCP, would like to be seen this month.    Could we send this information to you in [employer] or would you prefer to receive a phone call?:   Patient would prefer a phone call   Okay to leave a detailed message?: Yes at Home number on file [phone] (home)    Call taken on 10/7/2024 at 2:28 PM by Jaquan Ballard

## 2024-10-16 ENCOUNTER — DOCUMENTATION ONLY (OUTPATIENT)
Dept: SLEEP MEDICINE | Facility: CLINIC | Age: 64
End: 2024-10-16
Payer: COMMERCIAL

## 2024-10-16 NOTE — PROGRESS NOTES
14 day Sleep therapy management telephone visit    Diagnostic AHI:    HST: 14.3        LEFT VOICE MESSAGE FOR PATIENT TO RETURN CALL      Objective measures: 14 day rolling measures   COMPLIANCE LEAK AHI AVERAGE USE IN MINUTES   0 % 26.4 7.46 26   GOAL >70% GOAL < 24 LPM GOAL <5 GOAL >240          Device settings:  CPAP MIN CPAP MAX EPR RESMED SOFT RESPONSE SETTING   5.0 cm  H20 15.0 cm  H20 TWO OFF         Patient has the following upcoming sleep appts:      Replacement device: No  STM ordered by provider: Yes     Total time spent on accessing and  interpreting remote patient PAP therapy data  10 minutes    Total time spent counseling, coaching  and reviewing PAP therapy data with patient  0 minutes

## 2024-10-25 ENCOUNTER — OFFICE VISIT (OUTPATIENT)
Dept: INTERNAL MEDICINE | Facility: CLINIC | Age: 64
End: 2024-10-25
Payer: COMMERCIAL

## 2024-10-25 VITALS
DIASTOLIC BLOOD PRESSURE: 74 MMHG | BODY MASS INDEX: 24.2 KG/M2 | OXYGEN SATURATION: 98 % | WEIGHT: 182.6 LBS | TEMPERATURE: 97.5 F | HEIGHT: 73 IN | HEART RATE: 58 BPM | SYSTOLIC BLOOD PRESSURE: 130 MMHG

## 2024-10-25 DIAGNOSIS — G47.33 OSA (OBSTRUCTIVE SLEEP APNEA): ICD-10-CM

## 2024-10-25 DIAGNOSIS — E78.5 HYPERLIPIDEMIA LDL GOAL <70: ICD-10-CM

## 2024-10-25 DIAGNOSIS — R39.11 BENIGN PROSTATIC HYPERPLASIA WITH URINARY HESITANCY: ICD-10-CM

## 2024-10-25 DIAGNOSIS — Z12.5 SCREENING FOR PROSTATE CANCER: ICD-10-CM

## 2024-10-25 DIAGNOSIS — E03.9 HYPOTHYROIDISM, UNSPECIFIED TYPE: ICD-10-CM

## 2024-10-25 DIAGNOSIS — Z00.00 ENCOUNTER FOR ROUTINE ADULT HEALTH EXAMINATION WITHOUT ABNORMAL FINDINGS: Primary | ICD-10-CM

## 2024-10-25 DIAGNOSIS — N40.1 BENIGN PROSTATIC HYPERPLASIA WITH URINARY HESITANCY: ICD-10-CM

## 2024-10-25 PROCEDURE — 99214 OFFICE O/P EST MOD 30 MIN: CPT | Mod: 25 | Performed by: INTERNAL MEDICINE

## 2024-10-25 PROCEDURE — 99396 PREV VISIT EST AGE 40-64: CPT | Performed by: INTERNAL MEDICINE

## 2024-10-25 RX ORDER — LEVOTHYROXINE SODIUM 88 UG/1
88 TABLET ORAL DAILY
Qty: 90 TABLET | Refills: 3 | OUTPATIENT
Start: 2024-10-25

## 2024-10-25 RX ORDER — ROSUVASTATIN CALCIUM 10 MG/1
10 TABLET, COATED ORAL DAILY
Qty: 100 TABLET | Refills: 3 | Status: SHIPPED | OUTPATIENT
Start: 2024-10-25 | End: 2024-11-01

## 2024-10-25 RX ORDER — TAMSULOSIN HYDROCHLORIDE 0.4 MG/1
0.4 CAPSULE ORAL DAILY
Qty: 100 CAPSULE | Refills: 3 | Status: SHIPPED | OUTPATIENT
Start: 2024-10-25

## 2024-10-25 RX ORDER — LEVOTHYROXINE SODIUM 88 UG/1
88 TABLET ORAL DAILY
Qty: 100 TABLET | Refills: 3 | Status: SHIPPED | OUTPATIENT
Start: 2024-10-25

## 2024-10-25 SDOH — HEALTH STABILITY: PHYSICAL HEALTH: ON AVERAGE, HOW MANY DAYS PER WEEK DO YOU ENGAGE IN MODERATE TO STRENUOUS EXERCISE (LIKE A BRISK WALK)?: 5 DAYS

## 2024-10-25 ASSESSMENT — SOCIAL DETERMINANTS OF HEALTH (SDOH): HOW OFTEN DO YOU GET TOGETHER WITH FRIENDS OR RELATIVES?: TWICE A WEEK

## 2024-10-25 NOTE — PROGRESS NOTES
Preventive Care Visit  [facility]  Zhao Mitchell MD, Internal Medicine  Oct 25, 2024       ASSESSMENT:    1. Encounter for routine adult health examination without abnormal findings (Primary)  Patient declines COVID and influenza vaccinations.  Recommended Prevnar vaccination in 6 months after he turns 65.  Recommend Td vaccine in January 2025  - Comprehensive metabolic panel; Future  - CBC with platelets; Future    2. Hyperlipidemia LDL goal <70  Controlled.  Continue statin therapy.  Repeat lab 1 year.  Recent AST minimally elevated likely related to drink of alcohol patient had previous weekend.  ALT normal  - rosuvastatin (CRESTOR) 10 MG tablet; Take 1 tablet (10 mg) by mouth daily.  Dispense: 100 tablet; Refill: 3  - Comprehensive metabolic panel; Future  - Lipid panel reflex to direct LDL Fasting; Future    3. Benign prostatic hyperplasia with urinary hesitancy  Moderately controlled.  Patient wishes to continue current tamsulosin dose for now.  Has follow-up scheduled with  Christy  - tamsulosin (FLOMAX) 0.4 MG capsule; Take 1 capsule (0.4 mg) by mouth daily.  Dispense: 100 capsule; Refill: 3    4. Hypothyroidism, unspecified type  Controlled.  Continue current medication.  Repeat lab 1 year  - levothyroxine (SYNTHROID/LEVOTHROID) 88 MCG tablet; Take 1 tablet (88 mcg) by mouth daily.  Dispense: 100 tablet; Refill: 3  - TSH with free T4 reflex; Future    5. Screening for prostate cancer  Candidate for screening 1 year. Hx mild PA elevation thought due to prostate size per Urology. No nodule felt on exam today  - Prostate Specific Antigen Screen; Future     6. PRASHANT (obstructive sleep apnea)   Has PRASHANT but has not been tolerating face mask and not using much recently.  Energy still good and denies taking naps palpitations.  Will follow-up with sleep clinic medical equipment staff about other mask options      PLAN:  Continue current medications  Prescriptions refilled at your pharmacy.   "  Call  [phone] or use Cloopen to schedule a future lab appointment  fasting in 1 year.   For fasting labs, please refrain from eating for 8 hours or more.   Drink 2 glasses of water before your lab appointment. It is fine to take your  oral medications on the morning of the lab test as usual  Schedule a follow up appointment with me in clinic a few days after these future labs are drawn to review results and other medical issues as necessary  Because non-acute appointments to see me in clinic are currently booking out about 3 months at the clinic (for multiple reasons), please use Cloopen or call the appointment line now to schedule the future appointment so that it is \"on the books\".   I would recommend  a  Prevnar 20 vaccine (pneumonia risk reduction) after turn 65 and Td vaccine  (tetanus risk reduction) in January 2025. Get at a pharmacy  See Urology as scheduled  Continue working with sleep clinic re: possible CPAP masks that tolerate  Pt was informed regarding extra E&M billing for management of new or established medical issues not related to today's wellness/screening visit           Liz Mendes is a 64 year old, presenting for the following:  Physical  And follow-up medical issue as above         HPI     Health Care Directive  Patient does have a Health Care Directive: Patient states has Advance Directive and will bring in a copy to clinic.      10/25/2024   General Health   How would you rate your overall physical health? Good   Feel stress (tense, anxious, or unable to sleep) Not at all            10/25/2024   Nutrition   Three or more servings of calcium each day? (!) NO   Diet: Low fat/cholesterol   How many servings of fruit and vegetables per day? (!) 0-1   How many sweetened beverages each day? 0-1            10/25/2024   Exercise   Days per week of moderate/strenous exercise 5 days            10/25/2024   Social Factors   Frequency of gathering with friends or relatives Twice a week "   Worry food won't last until get money to buy more No   Food not last or not have enough money for food? No   Do you have housing? (Housing is defined as stable permanent housing and does not include staying ouside in a car, in a tent, in an abandoned building, in an overnight shelter, or couch-surfing.) Yes   Are you worried about losing your housing? No   Lack of transportation? No   Unable to get utilities (heat,electricity)? No            10/25/2024   Fall Risk   Fallen 2 or more times in the past year? No    Trouble with walking or balance? No        Patient-reported          10/25/2024   Dental   Dentist two times every year? Yes            10/25/2024   TB Screening   Were you born outside of the [place]? No            Today's PHQ-2 Score:       10/25/2024     9:09 AM   PHQ-2 ( 1999 [employer])   Q1: Little interest or pleasure in doing things 0    Q2: Feeling down, depressed or hopeless 0    PHQ-2 Score 0    Q1: Little interest or pleasure in doing things Not at all   Q2: Feeling down, depressed or hopeless Not at all   PHQ-2 Score 0       Patient-reported           10/25/2024   Substance Use   Alcohol more than 3/day or more than 7/wk No   Do you use any other substances recreationally? No        Social History     Tobacco Use    Smoking status: Never    Smokeless tobacco: Never   Substance Use Topics    Alcohol use: Yes     Comment: 4 per week    Drug use: No          10/25/2024   STI Screening   New sexual partner(s) since last STI/HIV test? No      Last PSA:   PSA   Date Value Ref Range Status   01/18/2021 4.14 (H) 0 - 4 ug/L Final     Comment:     Assay Method:  Chemiluminescence using Siemens Vista analyzer     Prostate Specific Antigen Screen   Date Value Ref Range Status   05/06/2024 4.53 (H) 0.00 - 4.50 ng/mL Final   10/03/2022 4.32 (H) 0.00 - 4.00 ug/L Final     ASCVD Risk   The ASCVD Risk score (Baylee VILLANUEVA, et al., 2019) failed to calculate for the following reasons:    The valid total cholesterol  "range is 130 to 320 mg/dL     Pt's past medical history, family history, habits, medications and allergies were reviewed with the patient today.   Most recent lab results reviewed with pt. Problem list and histories reviewed & adjusted, as indicated.        Review of Systems  CONSTITUTIONAL: NEGATIVE for fever, chills,. Weight down 7 pounds  INTEGUMENTARY/SKIN: NEGATIVE for worrisome rashes, moles or lesions  EYES: NEGATIVE for vision changes or irritation. Eye exam UTD march 2024  ENT/MOUTH: NEGATIVE for ear, mouth and throat problems  RESP: NEGATIVE for significant cough or SOB. USiung CPAP with PRASHANT and adjusting  issues  CV: NEGATIVE for chest pain, palpitations or peripheral edema  GI: NEGATIVE for nausea, current abdominal pain, heartburn, or change in bowel habits  : NEGATIVE for frequency, dysuria, or hematuria. Nocturia x2. Better since REZUM but still present. Seeing [facility]  in November. On Flomax.  Patient declines trial higher dosage  MUSCULOSKELETAL: NEGATIVE for significant arthralgias or myalgia  NEURO: NEGATIVE for weakness, dizziness or paresthesias  ENDOCRINE: NEGATIVE for temperature intolerance. On statin  HEME: NEGATIVE for bleeding problems  PSYCHIATRIC: NEGATIVE for changes in mood or affect since long-term     Objective    Exam  /74   Pulse 58   Temp 97.5  F (36.4  C) (Temporal)   Ht 1.854 m (6' 1\")   Wt 82.8 kg (182 lb 9.6 oz)   SpO2 98%   BMI 24.09 kg/m     Estimated body mass index is 24.09 kg/m  as calculated from the following:    Height as of this encounter: 1.854 m (6' 1\").    Weight as of this encounter: 82.8 kg (182 lb 9.6 oz).    Physical Exam  General appearance - healthy, alert, no distress  Skin - No rashes or lesions. Few seborrheic keratosis on trunk  Head - normocephalic, atraumatic  Eyes - LILLY, EOMI, fundi exam with nondilated pupils negative.  Ears - External ears normal. Canals clear. TM's normal.  Nose/Sinuses - Nares normal. Septum midline. Mucosa " normal. No drainage or sinus tenderness.  Oropharynx - No erythema, no adenopathy, no exudates.  Neck - Supple without adenopathy or thyromegaly. No bruits.  Lungs - Clear to auscultation without wheezes/rhonchi.  Heart - Regular rate and rhythm without murmurs, clicks, or gallops.  Nodes - No supraclavicular, axillary, or inguinal adenopathy palpable.  Abdomen - Abdomen soft, non-tender. BS normal. No masses or hepatosplenomegaly palpable. No bruits.  Extremities -No cyanosis, clubbing or edema.    Musculoskeletal - Spine ROM normal. Muscular strength intact.   Peripheral pulses - radial=4/4, femoral=4/4, posterior tibial=4/4, dorsalis pedis=4/4,  Neuro - Gait normal. Reflexes normal and symmetric. Sensation grossly WNL.  Genital - Normal-appearing male external genitalia. No scrotal masses or inguinal hernia palpable.   Rectal - Guaic negative stool. Normal tone. Prostate 2 plus  in size to palpation. No rectal masses or prostate nodularity palpable          Signed Electronically by: Zhao Mitchell MD

## 2024-10-25 NOTE — PATIENT INSTRUCTIONS
"Continue current medications  Prescriptions refilled at your pharmacy.    Call  [phone] or use Tenebril to schedule a future lab appointment  fasting in 1 year.   For fasting labs, please refrain from eating for 8 hours or more.   Drink 2 glasses of water before your lab appointment. It is fine to take your  oral medications on the morning of the lab test as usual  Schedule a follow up appointment with me in clinic a few days after these future labs are drawn to review results and other medical issues as necessary  Because non-acute appointments to see me in clinic are currently booking out about 3 months at the clinic (for multiple reasons), please use Tenebril or call the appointment line now to schedule the future appointment so that it is \"on the books\".   I would recommend  a  Prevnar 20 vaccine (pneumonia risk reduction) after turn 65 and Td vaccine  (tetanus risk reduction) in January 2025. Get at a pharmacy  See Urology as scheduled  Continue working with sleep clinic re: possible CPAP masks that tolerate  Pt was informed regarding extra E&M billing for management of new or established medical issues not related to today's wellness/screening visit    "

## 2024-10-27 PROBLEM — M54.2 CERVICALGIA: Status: RESOLVED | Noted: 2021-01-18 | Resolved: 2024-10-27

## 2024-10-27 PROBLEM — G47.33 OSA (OBSTRUCTIVE SLEEP APNEA): Status: ACTIVE | Noted: 2024-10-27

## 2024-11-01 DIAGNOSIS — E78.5 HYPERLIPIDEMIA LDL GOAL <70: ICD-10-CM

## 2024-11-01 NOTE — TELEPHONE ENCOUNTER
PER PHARMACY: THE PATIENT HAS INDICATED AN ALLERGY TO STATINS AND HAS BEEN PRESCRIBED CRESTOR TAB 10 MG. PLEASE VERIFY IF THE PRESCRIBER IS AWARE OF THE ALLERGY/POTENTIAL CROSS-SENSITIVITY.

## 2024-11-02 RX ORDER — ROSUVASTATIN CALCIUM 10 MG/1
10 TABLET, COATED ORAL DAILY
Qty: 100 TABLET | Refills: 3 | Status: SHIPPED | OUTPATIENT
Start: 2024-11-02

## 2024-11-02 NOTE — TELEPHONE ENCOUNTER
Has not had any side effects with rosuvastatin.  History of mild myalgias with atorvastatin.  Prescription refilled and note added on prescription

## 2024-11-21 ENCOUNTER — TRANSFERRED RECORDS (OUTPATIENT)
Dept: HEALTH INFORMATION MANAGEMENT | Facility: CLINIC | Age: 64
End: 2024-11-21
Payer: COMMERCIAL